# Patient Record
Sex: FEMALE | Race: WHITE | NOT HISPANIC OR LATINO | URBAN - METROPOLITAN AREA
[De-identification: names, ages, dates, MRNs, and addresses within clinical notes are randomized per-mention and may not be internally consistent; named-entity substitution may affect disease eponyms.]

---

## 2023-01-20 ENCOUNTER — INPATIENT (INPATIENT)
Facility: HOSPITAL | Age: 78
LOS: 3 days | Discharge: HOME | DRG: 557 | End: 2023-01-24
Attending: INTERNAL MEDICINE | Admitting: STUDENT IN AN ORGANIZED HEALTH CARE EDUCATION/TRAINING PROGRAM
Payer: COMMERCIAL

## 2023-01-20 VITALS
OXYGEN SATURATION: 95 % | RESPIRATION RATE: 16 BRPM | TEMPERATURE: 98 F | WEIGHT: 119.93 LBS | HEART RATE: 103 BPM | HEIGHT: 63 IN | SYSTOLIC BLOOD PRESSURE: 113 MMHG | DIASTOLIC BLOOD PRESSURE: 72 MMHG

## 2023-01-20 LAB
ALBUMIN SERPL ELPH-MCNC: 3.2 G/DL — LOW (ref 3.4–5)
ALBUMIN SERPL ELPH-MCNC: 3.9 G/DL — SIGNIFICANT CHANGE UP (ref 3.4–5)
ALP SERPL-CCNC: 101 U/L — SIGNIFICANT CHANGE UP (ref 40–120)
ALP SERPL-CCNC: 119 U/L — SIGNIFICANT CHANGE UP (ref 40–120)
ALT FLD-CCNC: 44 U/L — HIGH (ref 12–42)
ALT FLD-CCNC: 47 U/L — HIGH (ref 12–42)
ANION GAP SERPL CALC-SCNC: 5 MMOL/L — LOW (ref 9–16)
ANION GAP SERPL CALC-SCNC: 7 MMOL/L — LOW (ref 9–16)
APPEARANCE UR: CLEAR — SIGNIFICANT CHANGE UP
AST SERPL-CCNC: 113 U/L — HIGH (ref 15–37)
AST SERPL-CCNC: 89 U/L — HIGH (ref 15–37)
BACTERIA # UR AUTO: SIGNIFICANT CHANGE UP /HPF
BASOPHILS # BLD AUTO: 0.03 K/UL — SIGNIFICANT CHANGE UP (ref 0–0.2)
BASOPHILS NFR BLD AUTO: 0.4 % — SIGNIFICANT CHANGE UP (ref 0–2)
BILIRUB SERPL-MCNC: 0.6 MG/DL — SIGNIFICANT CHANGE UP (ref 0.2–1.2)
BILIRUB SERPL-MCNC: 0.7 MG/DL — SIGNIFICANT CHANGE UP (ref 0.2–1.2)
BILIRUB UR-MCNC: NEGATIVE — SIGNIFICANT CHANGE UP
BUN SERPL-MCNC: 11 MG/DL — SIGNIFICANT CHANGE UP (ref 7–23)
BUN SERPL-MCNC: 8 MG/DL — SIGNIFICANT CHANGE UP (ref 7–23)
CALCIUM SERPL-MCNC: 8.2 MG/DL — LOW (ref 8.5–10.5)
CALCIUM SERPL-MCNC: 9.6 MG/DL — SIGNIFICANT CHANGE UP (ref 8.5–10.5)
CHLORIDE SERPL-SCNC: 102 MMOL/L — SIGNIFICANT CHANGE UP (ref 96–108)
CHLORIDE SERPL-SCNC: 111 MMOL/L — HIGH (ref 96–108)
CK SERPL-CCNC: 4328 U/L — HIGH (ref 26–192)
CK SERPL-CCNC: 6316 U/L — HIGH (ref 26–192)
CO2 SERPL-SCNC: 24 MMOL/L — SIGNIFICANT CHANGE UP (ref 22–31)
CO2 SERPL-SCNC: 28 MMOL/L — SIGNIFICANT CHANGE UP (ref 22–31)
COLOR SPEC: YELLOW — SIGNIFICANT CHANGE UP
COMMENT - URINE: SIGNIFICANT CHANGE UP
CREAT SERPL-MCNC: 0.72 MG/DL — SIGNIFICANT CHANGE UP (ref 0.5–1.3)
CREAT SERPL-MCNC: 0.78 MG/DL — SIGNIFICANT CHANGE UP (ref 0.5–1.3)
DIFF PNL FLD: ABNORMAL
EGFR: 79 ML/MIN/1.73M2 — SIGNIFICANT CHANGE UP
EGFR: 87 ML/MIN/1.73M2 — SIGNIFICANT CHANGE UP
EOSINOPHIL # BLD AUTO: 0.01 K/UL — SIGNIFICANT CHANGE UP (ref 0–0.5)
EOSINOPHIL NFR BLD AUTO: 0.1 % — SIGNIFICANT CHANGE UP (ref 0–6)
EPI CELLS # UR: SIGNIFICANT CHANGE UP /HPF (ref 0–5)
GLUCOSE SERPL-MCNC: 130 MG/DL — HIGH (ref 70–99)
GLUCOSE SERPL-MCNC: 93 MG/DL — SIGNIFICANT CHANGE UP (ref 70–99)
GLUCOSE UR QL: NEGATIVE — SIGNIFICANT CHANGE UP
HCT VFR BLD CALC: 39 % — SIGNIFICANT CHANGE UP (ref 34.5–45)
HGB BLD-MCNC: 13.1 G/DL — SIGNIFICANT CHANGE UP (ref 11.5–15.5)
HYALINE CASTS # UR AUTO: ABNORMAL /LPF (ref 0–2)
IMM GRANULOCYTES NFR BLD AUTO: 0.1 % — SIGNIFICANT CHANGE UP (ref 0–0.9)
KETONES UR-MCNC: 15 MG/DL
LEUKOCYTE ESTERASE UR-ACNC: NEGATIVE — SIGNIFICANT CHANGE UP
LYMPHOCYTES # BLD AUTO: 0.5 K/UL — LOW (ref 1–3.3)
LYMPHOCYTES # BLD AUTO: 6.2 % — LOW (ref 13–44)
MCHC RBC-ENTMCNC: 30.7 PG — SIGNIFICANT CHANGE UP (ref 27–34)
MCHC RBC-ENTMCNC: 33.6 GM/DL — SIGNIFICANT CHANGE UP (ref 32–36)
MCV RBC AUTO: 91.3 FL — SIGNIFICANT CHANGE UP (ref 80–100)
MONOCYTES # BLD AUTO: 1.04 K/UL — HIGH (ref 0–0.9)
MONOCYTES NFR BLD AUTO: 12.9 % — SIGNIFICANT CHANGE UP (ref 2–14)
NEUTROPHILS # BLD AUTO: 6.48 K/UL — SIGNIFICANT CHANGE UP (ref 1.8–7.4)
NEUTROPHILS NFR BLD AUTO: 80.3 % — HIGH (ref 43–77)
NITRITE UR-MCNC: NEGATIVE — SIGNIFICANT CHANGE UP
NRBC # BLD: 0 /100 WBCS — SIGNIFICANT CHANGE UP (ref 0–0)
PH UR: 6.5 — SIGNIFICANT CHANGE UP (ref 5–8)
PLATELET # BLD AUTO: 129 K/UL — LOW (ref 150–400)
POTASSIUM SERPL-MCNC: 3.9 MMOL/L — SIGNIFICANT CHANGE UP (ref 3.5–5.3)
POTASSIUM SERPL-MCNC: 4 MMOL/L — SIGNIFICANT CHANGE UP (ref 3.5–5.3)
POTASSIUM SERPL-SCNC: 3.9 MMOL/L — SIGNIFICANT CHANGE UP (ref 3.5–5.3)
POTASSIUM SERPL-SCNC: 4 MMOL/L — SIGNIFICANT CHANGE UP (ref 3.5–5.3)
PROT SERPL-MCNC: 6.2 G/DL — LOW (ref 6.4–8.2)
PROT SERPL-MCNC: 7.3 G/DL — SIGNIFICANT CHANGE UP (ref 6.4–8.2)
PROT UR-MCNC: 30 MG/DL
RBC # BLD: 4.27 M/UL — SIGNIFICANT CHANGE UP (ref 3.8–5.2)
RBC # FLD: 12.1 % — SIGNIFICANT CHANGE UP (ref 10.3–14.5)
RBC CASTS # UR COMP ASSIST: < 5 /HPF — SIGNIFICANT CHANGE UP
SARS-COV-2 RNA SPEC QL NAA+PROBE: DETECTED
SODIUM SERPL-SCNC: 135 MMOL/L — SIGNIFICANT CHANGE UP (ref 132–145)
SODIUM SERPL-SCNC: 142 MMOL/L — SIGNIFICANT CHANGE UP (ref 132–145)
SP GR SPEC: 1.02 — SIGNIFICANT CHANGE UP (ref 1–1.03)
UROBILINOGEN FLD QL: 0.2 E.U./DL — SIGNIFICANT CHANGE UP
WBC # BLD: 8.07 K/UL — SIGNIFICANT CHANGE UP (ref 3.8–10.5)
WBC # FLD AUTO: 8.07 K/UL — SIGNIFICANT CHANGE UP (ref 3.8–10.5)
WBC UR QL: < 5 /HPF — SIGNIFICANT CHANGE UP

## 2023-01-20 PROCEDURE — 99223 1ST HOSP IP/OBS HIGH 75: CPT

## 2023-01-20 PROCEDURE — 73562 X-RAY EXAM OF KNEE 3: CPT | Mod: 26,LT

## 2023-01-20 PROCEDURE — 73070 X-RAY EXAM OF ELBOW: CPT | Mod: 26,LT,76

## 2023-01-20 PROCEDURE — 71045 X-RAY EXAM CHEST 1 VIEW: CPT | Mod: 26

## 2023-01-20 PROCEDURE — 72125 CT NECK SPINE W/O DYE: CPT | Mod: 26

## 2023-01-20 PROCEDURE — 72170 X-RAY EXAM OF PELVIS: CPT | Mod: 26

## 2023-01-20 PROCEDURE — 70450 CT HEAD/BRAIN W/O DYE: CPT | Mod: 26

## 2023-01-20 RX ORDER — SODIUM CHLORIDE 9 MG/ML
1000 INJECTION INTRAMUSCULAR; INTRAVENOUS; SUBCUTANEOUS
Refills: 0 | Status: DISCONTINUED | OUTPATIENT
Start: 2023-01-20 | End: 2023-01-21

## 2023-01-20 RX ORDER — TETANUS TOXOID, REDUCED DIPHTHERIA TOXOID AND ACELLULAR PERTUSSIS VACCINE, ADSORBED 5; 2.5; 8; 8; 2.5 [IU]/.5ML; [IU]/.5ML; UG/.5ML; UG/.5ML; UG/.5ML
0.5 SUSPENSION INTRAMUSCULAR ONCE
Refills: 0 | Status: COMPLETED | OUTPATIENT
Start: 2023-01-20 | End: 2023-01-20

## 2023-01-20 RX ORDER — SODIUM CHLORIDE 9 MG/ML
1000 INJECTION INTRAMUSCULAR; INTRAVENOUS; SUBCUTANEOUS ONCE
Refills: 0 | Status: COMPLETED | OUTPATIENT
Start: 2023-01-20 | End: 2023-01-20

## 2023-01-20 RX ADMIN — SODIUM CHLORIDE 250 MILLILITER(S): 9 INJECTION INTRAMUSCULAR; INTRAVENOUS; SUBCUTANEOUS at 18:58

## 2023-01-20 RX ADMIN — SODIUM CHLORIDE 1000 MILLILITER(S): 9 INJECTION INTRAMUSCULAR; INTRAVENOUS; SUBCUTANEOUS at 15:34

## 2023-01-20 RX ADMIN — SODIUM CHLORIDE 1000 MILLILITER(S): 9 INJECTION INTRAMUSCULAR; INTRAVENOUS; SUBCUTANEOUS at 14:16

## 2023-01-20 RX ADMIN — TETANUS TOXOID, REDUCED DIPHTHERIA TOXOID AND ACELLULAR PERTUSSIS VACCINE, ADSORBED 0.5 MILLILITER(S): 5; 2.5; 8; 8; 2.5 SUSPENSION INTRAMUSCULAR at 12:10

## 2023-01-20 RX ADMIN — SODIUM CHLORIDE 1000 MILLILITER(S): 9 INJECTION INTRAMUSCULAR; INTRAVENOUS; SUBCUTANEOUS at 23:21

## 2023-01-20 NOTE — ED ADULT TRIAGE NOTE - CHIEF COMPLAINT QUOTE
Pt BIBA from [facility] s/p fall at around 3 am today. No noted injury, no head strike. VSS. As per EMS pt sent for eval as this is her 3rd fall
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
## 2023-01-20 NOTE — ED PROVIDER NOTE - OBJECTIVE STATEMENT
76 y/o F with PMHx of psychiatric conditions (on trilafon, restoril, trazadone), HLD BIBEMS from [facility] s/p fall reportedly at 3 AM. Pt reports she rolled off her twin bed last night and landed on her back on the ground, denies LOC or AC use. Pt attempted to get up after but was unable to get up and states she was found int he morning by a worker at the NH. Currently pt is without any complaints. EMS reports this is pt 3rd fall and pt was sent for evaluation for recurrent falls but pt denies any other recent falls besides this an denies difficulty ambulating or associated lightheadedness, dizziness, cp, sob. Pt states she feels safe where she currently resides and would like to return after her workup here. 76 y/o F with PMHx of psychiatric conditions (on trilafon, restoril, trazadone), HLD BIBEMS from [facility] s/p fall reportedly at 3 AM. Pt reports she rolled off her twin bed last night and landed on her back on the ground, denies LOC or AC use. Pt attempted to get up after but was unable to get up and states she was found int he morning by a worker at the NH. Currently pt endorses fatigue and wants to sleep but without any complaints. EMS reports this is pt 3rd fall and pt was sent for evaluation for recurrent falls but pt denies any other recent falls besides this an denies difficulty ambulating or associated lightheadedness, dizziness, cp, sob. Pt states she feels safe where she currently resides and would like to return after her workup here. Denies fever/chills, HA, neck or back pain, dizziness, syncope, cough, cp, sob, abd pain, n/v/d/c, urinary ssx, leg swelling. Unknown if tdap up to date.

## 2023-01-20 NOTE — ED PROVIDER NOTE - CARE PLAN
1 Principal Discharge DX:	Fall  Assessment and plan of treatment:	Imaging for trauma unremarkable  Labs s/f cpk 4328, Cr 0.78, UA with small blood, covid+  Pt given 2 L NS, plan to admit under observation status, repeat CPK after continuous IVF, reassess gait and consider admission if pt is unable to ambulate  Secondary Diagnosis:	Rhabdomyolysis  Secondary Diagnosis:	2019 novel coronavirus disease (COVID-19)

## 2023-01-20 NOTE — ED PROVIDER NOTE - NS ED ATTENDING STATEMENT MOD
This was a shared visit with the NEAL. I reviewed and verified the documentation and independently performed the documented:

## 2023-01-20 NOTE — ED ADULT NURSE NOTE - OBJECTIVE STATEMENT
Pt came in c/o of fall out of bed today at [facility]. Pt denies pain, head strike, loc, ac use. Pt presents with redness and abrasion to the left knee and left hand. A&Ox3 speaking in full sentences. Bed alarm on

## 2023-01-20 NOTE — ED PROVIDER NOTE - NEUROLOGICAL, MLM
Alert and oriented, no focal deficits, no motor or sensory deficits, unable to ambulate as she states she is too fatigued

## 2023-01-20 NOTE — ED PROVIDER NOTE - WR ORDER NAME 4
Detail Level: Simple Additional Notes: Gave sample of DermaMend anti itch cream. Xray Elbow AP + Lateral, Right

## 2023-01-20 NOTE — ED ADULT NURSE NOTE - CHIEF COMPLAINT QUOTE
Pt BIBA from [facility] s/p fall at around 3 am today. No noted injury, no head strike. VSS. As per EMS pt sent for eval as this is her 3rd fall
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
## 2023-01-20 NOTE — ED PROVIDER NOTE - PHYSICAL EXAMINATION
Well-appearing in NAD, alert and oriented x 3, slow to answer questions  Head NCAT, No c/t/l midline or paraspinal TTP, no overlying erythema, swelling or edema, no stepoff or crepitus, full active ROM  +Left anterior shoulder abrasions, no chest wall TTP or crepitus  B/l posterior elbow abrasions, mild soft tissue TTP, no bony tenderness, no effusions  B/l UE with FROM at shoulder/elbow/wrist with strength 5/5,  strength 5/5, NVID  Pelvis stable  +Left anterior knee abrasion with mild soft tissue TTP, no bony tenderness, no effusion  B/l LE with FROM at hip/knee/ankles with strength 5/5, NVID  No facial droop, no pronator drift, no difficulty speaking or dysarthria, no hemineglect  B/l UE and LE with SILT  Refuses to ambulate 2/2 weakness

## 2023-01-20 NOTE — ED CDU PROVIDER INITIAL DAY NOTE - PROGRESS NOTE DETAILS
Pt received NS 2 L then continuous fluids  CPK uptrended from 4328 to 6316, stable cr 0.72  Will plan to admit

## 2023-01-20 NOTE — ED PROVIDER NOTE - PLAN OF CARE
Imaging for trauma unremarkable  Labs s/f cpk 4328, Cr 0.78, UA with small blood, covid+  Pt given 2 L NS, plan to admit under observation status, repeat CPK after continuous IVF, reassess gait and consider admission if pt is unable to ambulate
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
## 2023-01-21 DIAGNOSIS — U07.1 COVID-19: ICD-10-CM

## 2023-01-21 DIAGNOSIS — M62.82 RHABDOMYOLYSIS: ICD-10-CM

## 2023-01-21 DIAGNOSIS — F99 MENTAL DISORDER, NOT OTHERWISE SPECIFIED: ICD-10-CM
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
## 2023-01-21 DIAGNOSIS — E78.5 HYPERLIPIDEMIA, UNSPECIFIED: ICD-10-CM

## 2023-01-21 DIAGNOSIS — Z29.9 ENCOUNTER FOR PROPHYLACTIC MEASURES, UNSPECIFIED: ICD-10-CM

## 2023-01-21 LAB
ALBUMIN SERPL ELPH-MCNC: 4.2 G/DL — SIGNIFICANT CHANGE UP (ref 3.3–5)
ALP SERPL-CCNC: 117 U/L — SIGNIFICANT CHANGE UP (ref 40–120)
ALT FLD-CCNC: 61 U/L — HIGH (ref 10–45)
ANION GAP SERPL CALC-SCNC: 12 MMOL/L — SIGNIFICANT CHANGE UP (ref 5–17)
AST SERPL-CCNC: 197 U/L — HIGH (ref 10–40)
BILIRUB SERPL-MCNC: 0.4 MG/DL — SIGNIFICANT CHANGE UP (ref 0.2–1.2)
BUN SERPL-MCNC: 5 MG/DL — LOW (ref 7–23)
CALCIUM SERPL-MCNC: 9 MG/DL — SIGNIFICANT CHANGE UP (ref 8.4–10.5)
CHLORIDE SERPL-SCNC: 104 MMOL/L — SIGNIFICANT CHANGE UP (ref 96–108)
CK SERPL-CCNC: 8334 U/L — HIGH (ref 25–170)
CK SERPL-CCNC: 9855 U/L — HIGH (ref 25–170)
CO2 SERPL-SCNC: 21 MMOL/L — LOW (ref 22–31)
CREAT SERPL-MCNC: 0.55 MG/DL — SIGNIFICANT CHANGE UP (ref 0.5–1.3)
EGFR: 96 ML/MIN/1.73M2 — SIGNIFICANT CHANGE UP
GLUCOSE SERPL-MCNC: 134 MG/DL — HIGH (ref 70–99)
MAGNESIUM SERPL-MCNC: 2.1 MG/DL — SIGNIFICANT CHANGE UP (ref 1.6–2.6)
PHOSPHATE SERPL-MCNC: 1.7 MG/DL — LOW (ref 2.5–4.5)
POTASSIUM SERPL-MCNC: 4.1 MMOL/L — SIGNIFICANT CHANGE UP (ref 3.5–5.3)
POTASSIUM SERPL-SCNC: 4.1 MMOL/L — SIGNIFICANT CHANGE UP (ref 3.5–5.3)
PROT SERPL-MCNC: 7.1 G/DL — SIGNIFICANT CHANGE UP (ref 6–8.3)
SODIUM SERPL-SCNC: 137 MMOL/L — SIGNIFICANT CHANGE UP (ref 135–145)

## 2023-01-21 PROCEDURE — 99221 1ST HOSP IP/OBS SF/LOW 40: CPT

## 2023-01-21 RX ORDER — TEMAZEPAM 15 MG/1
30 CAPSULE ORAL AT BEDTIME
Refills: 0 | Status: DISCONTINUED | OUTPATIENT
Start: 2023-01-21 | End: 2023-01-24

## 2023-01-21 RX ORDER — PERPHENAZINE 8 MG/1
8 TABLET, FILM COATED ORAL EVERY 6 HOURS
Refills: 0 | Status: DISCONTINUED | OUTPATIENT
Start: 2023-01-21 | End: 2023-01-24

## 2023-01-21 RX ORDER — SODIUM,POTASSIUM PHOSPHATES 278-250MG
1 POWDER IN PACKET (EA) ORAL ONCE
Refills: 0 | Status: COMPLETED | OUTPATIENT
Start: 2023-01-21 | End: 2023-01-21

## 2023-01-21 RX ORDER — ENOXAPARIN SODIUM 100 MG/ML
40 INJECTION SUBCUTANEOUS EVERY 24 HOURS
Refills: 0 | Status: DISCONTINUED | OUTPATIENT
Start: 2023-01-21 | End: 2023-01-24

## 2023-01-21 RX ORDER — ACETAMINOPHEN 500 MG
650 TABLET ORAL ONCE
Refills: 0 | Status: COMPLETED | OUTPATIENT
Start: 2023-01-21 | End: 2023-01-21

## 2023-01-21 RX ORDER — ATORVASTATIN CALCIUM 80 MG/1
20 TABLET, FILM COATED ORAL AT BEDTIME
Refills: 0 | Status: DISCONTINUED | OUTPATIENT
Start: 2023-01-21 | End: 2023-01-23

## 2023-01-21 RX ORDER — TEMAZEPAM 15 MG/1
1 CAPSULE ORAL
Qty: 0 | Refills: 0 | DISCHARGE

## 2023-01-21 RX ORDER — TRAZODONE HCL 50 MG
100 TABLET ORAL AT BEDTIME
Refills: 0 | Status: DISCONTINUED | OUTPATIENT
Start: 2023-01-21 | End: 2023-01-24

## 2023-01-21 RX ORDER — PERPHENAZINE 8 MG/1
1 TABLET, FILM COATED ORAL
Qty: 0 | Refills: 0 | DISCHARGE

## 2023-01-21 RX ORDER — SODIUM CHLORIDE 9 MG/ML
1000 INJECTION INTRAMUSCULAR; INTRAVENOUS; SUBCUTANEOUS
Refills: 0 | Status: DISCONTINUED | OUTPATIENT
Start: 2023-01-21 | End: 2023-01-22

## 2023-01-21 RX ORDER — SODIUM CHLORIDE 9 MG/ML
1000 INJECTION INTRAMUSCULAR; INTRAVENOUS; SUBCUTANEOUS
Refills: 0 | Status: DISCONTINUED | OUTPATIENT
Start: 2023-01-21 | End: 2023-01-21

## 2023-01-21 RX ORDER — ACETAMINOPHEN 500 MG
650 TABLET ORAL EVERY 6 HOURS
Refills: 0 | Status: DISCONTINUED | OUTPATIENT
Start: 2023-01-21 | End: 2023-01-24

## 2023-01-21 RX ADMIN — Medication 650 MILLIGRAM(S): at 09:05

## 2023-01-21 RX ADMIN — ENOXAPARIN SODIUM 40 MILLIGRAM(S): 100 INJECTION SUBCUTANEOUS at 17:22

## 2023-01-21 RX ADMIN — PERPHENAZINE 8 MILLIGRAM(S): 8 TABLET, FILM COATED ORAL at 17:21

## 2023-01-21 RX ADMIN — Medication 100 MILLIGRAM(S): at 21:29

## 2023-01-21 RX ADMIN — Medication 1 PACKET(S): at 19:50

## 2023-01-21 RX ADMIN — ATORVASTATIN CALCIUM 20 MILLIGRAM(S): 80 TABLET, FILM COATED ORAL at 21:29

## 2023-01-21 NOTE — H&P ADULT - NSHPLABSRESULTS_GEN_ALL_CORE
.  LABS:                         13.1   8.07  )-----------( 129      ( 2023 10:41 )             39.0         142  |  111<H>  |  8   ----------------------------<  93  3.9   |  24  |  0.72    Ca    8.2<L>      2023 20:10    TPro  6.2<L>  /  Alb  3.2<L>  /  TBili  0.6  /  DBili  x   /  AST  113<H>  /  ALT  44<H>  /  AlkPhos  101        Urinalysis Basic - ( 2023 13:43 )    Color: Yellow / Appearance: Clear / S.020 / pH: x  Gluc: x / Ketone: 15 mg/dL  / Bili: NEGATIVE / Urobili: 0.2 E.U./dL   Blood: x / Protein: 30 mg/dL / Nitrite: NEGATIVE   Leuk Esterase: NEGATIVE / RBC: < 5 /HPF / WBC < 5 /HPF   Sq Epi: x / Non Sq Epi: 0-5 /HPF / Bacteria: None /HPF      CARDIAC MARKERS ( 2023 20:10 )  x     / x     / 6316 U/L / x     / x      CARDIAC MARKERS ( 2023 10:41 )  x     / x     / 4328 U/L / x     / x                RADIOLOGY, EKG & ADDITIONAL TESTS: Reviewed.

## 2023-01-21 NOTE — PATIENT PROFILE ADULT - FALL HARM RISK - HARM RISK INTERVENTIONS
Assistance with ambulation/Assistance OOB with selected safe patient handling equipment/Communicate Risk of Fall with Harm to all staff/Discuss with provider need for PT consult/Monitor gait and stability/Provide patient with walking aids - walker, cane, crutches/Reinforce activity limits and safety measures with patient and family/Tailored Fall Risk Interventions/Use of alarms - bed, chair and/or voice tab/Visual Cue: Yellow wristband and red socks/Bed in lowest position, wheels locked, appropriate side rails in place/Call bell, personal items and telephone in reach/Instruct patient to call for assistance before getting out of bed or chair/Non-slip footwear when patient is out of bed/[place] to call system/Physically safe environment - no spills, clutter or unnecessary equipment/Purposeful Proactive Rounding/Room/bathroom lighting operational, light cord in reach

## 2023-01-21 NOTE — H&P ADULT - PROBLEM SELECTOR PLAN 3
- continue with lipitor qd found to be COVID +, asymptomatic  - saturating well on RA,   - no need for decadron or remdesivir  - vaccinatedx2 with booster

## 2023-01-21 NOTE — H&P ADULT - HISTORY OF PRESENT ILLNESS
78 y/o F with PMHx of psychiatric conditions (on trilafon, restoril, trazadone), HLD BIBEMS from [facility] s/p fall   78 y/o F with PMHx of psychiatric conditions (on trilafon, restoril, trazadone) and HLD BIBEMS from [facility] s/p fall. Patient states she rolled out of bed in the middle of the night. She denies any LOC or head trauma, states she fell on her back. She denies any pain currently. She has hx of falls, has fallen three times in past- but none recently. She denies any dizziness, chest pain, SOB, or HA. Denies palpitations. She spent the night on the floor and did not get up until this morning.,     In the ED, patient afebrile with VSS. She has CK elevation ~6000 and was given IVF. Patient admitted for PT and IVF in setting of mild rhabdomyolysis   CT head without evidence of acute stroke, fracture, or hemorrhage.  CT cervical spine, Xrays of elbows and knee all without evidence of acute fracture.

## 2023-01-21 NOTE — ED ADULT NURSE REASSESSMENT NOTE - NS ED NURSE REASSESS COMMENT FT1
PT. received AAOx4 semi fowlers in stretcher breathing at ease on room air in NAD. 20g to RAC no redness, swelling, or tenderness noted. Pt. is admitted pending transport to [facility]. Pt. repositioned in bed and changed into gown,.
Pt received from [facility]. Pt resting comfortably in bed. No s/s of acute distress. Will continue to monitor
Patient made aware that she is admitted and is awaiting transfer to [facility], resting comfortably on the stretcher, no acute distress noted.
Patient received resting on the stretcher, awake and alert, axox3. Patient denies acute pain at this time. Awaiting repeat lab work, has fluids infusing at this time.

## 2023-01-21 NOTE — PATIENT PROFILE ADULT - FALL HARM RISK - PATIENT NEEDS ASSISTANCE
What Is The Condition That You Are Returning For Follow-Up?: other
Additional History: Pt is here for 6 mo f/u for psoriasis. She is doing a little better know but all summer she had flare ups   Pt is taking Otezla and the clobetasol
Standing/Walking

## 2023-01-21 NOTE — H&P ADULT - NSHPPHYSICALEXAM_GEN_ALL_CORE
.  VITAL SIGNS:  T(C): 37.1 (01-21-23 @ 09:54), Max: 38 (01-21-23 @ 08:57)  T(F): 98.8 (01-21-23 @ 09:54), Max: 100.4 (01-21-23 @ 08:57)  HR: 79 (01-21-23 @ 09:54) (66 - 89)  BP: 134/67 (01-21-23 @ 09:54) (117/72 - 141/80)  BP(mean): 84 (01-21-23 @ 03:49) (84 - 85)  RR: 17 (01-21-23 @ 09:54) (16 - 18)  SpO2: 96% (01-21-23 @ 09:54) (93% - 97%)  Wt(kg): --    PHYSICAL EXAM:    Constitutional: NAD   HEENT: NC/AT, PERRL, EOMI, clear conjunctiva, uvula midline, no oropharyngeal erythema or exudates; MMM  Neck: supple; no JVD   Respiratory: CTA B/L; no W/R/R, no retractions  Cardiac: +S1/S2; RRR; no M/R/G  Gastrointestinal: soft, NT/ND; no rebound or guarding; +BSx4  Back: no vertebral point tenderness, no CVAT B/L  Extremities: WWP, no clubbing or cyanosis; no peripheral edema  Musculoskeletal: NROM x4; no joint swelling, tenderness or erythema  Vascular: peripheral pulses present   Dermatologic: skin warm, dry and intact; no rashes, wounds, or scars  Neurologic: AAOx3; CNII-XII grossly intact; no focal deficits .  VITAL SIGNS:  T(C): 37.1 (01-21-23 @ 09:54), Max: 38 (01-21-23 @ 08:57)  T(F): 98.8 (01-21-23 @ 09:54), Max: 100.4 (01-21-23 @ 08:57)  HR: 79 (01-21-23 @ 09:54) (66 - 89)  BP: 134/67 (01-21-23 @ 09:54) (117/72 - 141/80)  BP(mean): 84 (01-21-23 @ 03:49) (84 - 85)  RR: 17 (01-21-23 @ 09:54) (16 - 18)  SpO2: 96% (01-21-23 @ 09:54) (93% - 97%)  Wt(kg): --    PHYSICAL EXAM:    Constitutional: NAD   HEENT: NC/AT, PERRL, EOMI, clear conjunctiva, uvula midline, no oropharyngeal erythema or exudates; MMM  Neck: supple; no JVD   Respiratory: CTA B/L; no W/R/R, no retractions  Cardiac: +S1/S2; RRR; no M/R/G  Gastrointestinal: soft, NT/ND; no rebound or guarding; +BSx4  Back: no vertebral point tenderness, no CVAT B/L  Extremities: WWP, no clubbing or cyanosis; no peripheral edema  Musculoskeletal: NROM x4; no joint swelling, tenderness or erythema  Vascular: peripheral pulses present   Dermatologic: skin warm, dry and intact; no rashes, wounds, or scars, ecchymosis of elbows and knees  Neurologic: AAOx3; CNII-XII grossly intact; no focal deficits

## 2023-01-21 NOTE — H&P ADULT - PROBLEM SELECTOR PLAN 6
F: NS @100  E; replete as needed, close monitoring in setting of rhabdo  N: regular   DVT: lovenox     Dispo: RMF

## 2023-01-21 NOTE — H&P ADULT - PROBLEM SELECTOR PLAN 1
s/p mechanical fall (rolled out of bed), no LOC or head trauma  - CT head without evidence of stroke, hemorrhage, or fracture  - CT cervical spine without aciute fracture   - Xray pelvis, knee and elbows without fractures     Plan   - PT/OT  - tylenol prn for pain

## 2023-01-21 NOTE — H&P ADULT - PROBLEM SELECTOR PLAN 4
- unknown psychiatric illness  - home medications include: trazodone 100mg, trilafon 8mg qid, restoril 30mg qhs   - continue home medications   - medications confirmed with [facility] - continue with lipitor qd

## 2023-01-21 NOTE — H&P ADULT - PROBLEM SELECTOR PLAN 2
- CK elevated to ~6000, UA with blood and no RBCs  - 2/2 fall   - potassium wnl, Cr stable, exam benign       Plan   - continue with IVF, NS @100/hr   - monitor UOP, strict I/Os  - monitor CMP qd, watch for signs of renal injury or hyperkalemia   - encourage PO intake       #Transaminitis   - possibly 2/2 rhabdo   - other etiology hepatic steatosis, no alcohol use  - trend CMP 06-Jul-2017

## 2023-01-21 NOTE — H&P ADULT - ASSESSMENT
78 y/o F with PMHx of psychiatric conditions (on trilafon, restoril, trazadone) and HLD BIBEMS from [facility] s/p mechanical fall, found to have acute rhabdomyolysis.

## 2023-01-22 LAB
ALBUMIN SERPL ELPH-MCNC: 3.2 G/DL — LOW (ref 3.3–5)
ALP SERPL-CCNC: 88 U/L — SIGNIFICANT CHANGE UP (ref 40–120)
ALT FLD-CCNC: 47 U/L — HIGH (ref 10–45)
ANION GAP SERPL CALC-SCNC: 10 MMOL/L — SIGNIFICANT CHANGE UP (ref 5–17)
AST SERPL-CCNC: 138 U/L — HIGH (ref 10–40)
BASOPHILS # BLD AUTO: 0.04 K/UL — SIGNIFICANT CHANGE UP (ref 0–0.2)
BASOPHILS NFR BLD AUTO: 0.8 % — SIGNIFICANT CHANGE UP (ref 0–2)
BILIRUB SERPL-MCNC: 0.4 MG/DL — SIGNIFICANT CHANGE UP (ref 0.2–1.2)
BUN SERPL-MCNC: 6 MG/DL — LOW (ref 7–23)
CALCIUM SERPL-MCNC: 8.2 MG/DL — LOW (ref 8.4–10.5)
CHLORIDE SERPL-SCNC: 106 MMOL/L — SIGNIFICANT CHANGE UP (ref 96–108)
CK SERPL-CCNC: 5029 U/L — HIGH (ref 25–170)
CO2 SERPL-SCNC: 22 MMOL/L — SIGNIFICANT CHANGE UP (ref 22–31)
CREAT SERPL-MCNC: 0.62 MG/DL — SIGNIFICANT CHANGE UP (ref 0.5–1.3)
EGFR: 93 ML/MIN/1.73M2 — SIGNIFICANT CHANGE UP
EOSINOPHIL # BLD AUTO: 0.06 K/UL — SIGNIFICANT CHANGE UP (ref 0–0.5)
EOSINOPHIL NFR BLD AUTO: 1.2 % — SIGNIFICANT CHANGE UP (ref 0–6)
GLUCOSE SERPL-MCNC: 107 MG/DL — HIGH (ref 70–99)
HCT VFR BLD CALC: 36.2 % — SIGNIFICANT CHANGE UP (ref 34.5–45)
HCV AB S/CO SERPL IA: 0.05 S/CO — SIGNIFICANT CHANGE UP
HCV AB SERPL-IMP: SIGNIFICANT CHANGE UP
HGB BLD-MCNC: 12 G/DL — SIGNIFICANT CHANGE UP (ref 11.5–15.5)
IMM GRANULOCYTES NFR BLD AUTO: 0.2 % — SIGNIFICANT CHANGE UP (ref 0–0.9)
LYMPHOCYTES # BLD AUTO: 0.97 K/UL — LOW (ref 1–3.3)
LYMPHOCYTES # BLD AUTO: 20.1 % — SIGNIFICANT CHANGE UP (ref 13–44)
MAGNESIUM SERPL-MCNC: 1.9 MG/DL — SIGNIFICANT CHANGE UP (ref 1.6–2.6)
MCHC RBC-ENTMCNC: 30.5 PG — SIGNIFICANT CHANGE UP (ref 27–34)
MCHC RBC-ENTMCNC: 33.1 GM/DL — SIGNIFICANT CHANGE UP (ref 32–36)
MCV RBC AUTO: 92.1 FL — SIGNIFICANT CHANGE UP (ref 80–100)
MONOCYTES # BLD AUTO: 0.8 K/UL — SIGNIFICANT CHANGE UP (ref 0–0.9)
MONOCYTES NFR BLD AUTO: 16.6 % — HIGH (ref 2–14)
NEUTROPHILS # BLD AUTO: 2.94 K/UL — SIGNIFICANT CHANGE UP (ref 1.8–7.4)
NEUTROPHILS NFR BLD AUTO: 61.1 % — SIGNIFICANT CHANGE UP (ref 43–77)
NRBC # BLD: 0 /100 WBCS — SIGNIFICANT CHANGE UP (ref 0–0)
PHOSPHATE SERPL-MCNC: 2.2 MG/DL — LOW (ref 2.5–4.5)
PLATELET # BLD AUTO: 107 K/UL — LOW (ref 150–400)
POTASSIUM SERPL-MCNC: 3.3 MMOL/L — LOW (ref 3.5–5.3)
POTASSIUM SERPL-SCNC: 3.3 MMOL/L — LOW (ref 3.5–5.3)
PROT SERPL-MCNC: 5.6 G/DL — LOW (ref 6–8.3)
RBC # BLD: 3.93 M/UL — SIGNIFICANT CHANGE UP (ref 3.8–5.2)
RBC # FLD: 12.5 % — SIGNIFICANT CHANGE UP (ref 10.3–14.5)
SODIUM SERPL-SCNC: 138 MMOL/L — SIGNIFICANT CHANGE UP (ref 135–145)
WBC # BLD: 4.82 K/UL — SIGNIFICANT CHANGE UP (ref 3.8–10.5)
WBC # FLD AUTO: 4.82 K/UL — SIGNIFICANT CHANGE UP (ref 3.8–10.5)

## 2023-01-22 PROCEDURE — 99232 SBSQ HOSP IP/OBS MODERATE 35: CPT | Mod: GC

## 2023-01-22 RX ORDER — POTASSIUM PHOSPHATE, MONOBASIC POTASSIUM PHOSPHATE, DIBASIC 236; 224 MG/ML; MG/ML
30 INJECTION, SOLUTION INTRAVENOUS ONCE
Refills: 0 | Status: COMPLETED | OUTPATIENT
Start: 2023-01-22 | End: 2023-01-22

## 2023-01-22 RX ORDER — POTASSIUM CHLORIDE 20 MEQ
20 PACKET (EA) ORAL
Refills: 0 | Status: COMPLETED | OUTPATIENT
Start: 2023-01-22 | End: 2023-01-22

## 2023-01-22 RX ORDER — SODIUM CHLORIDE 9 MG/ML
1000 INJECTION, SOLUTION INTRAVENOUS
Refills: 0 | Status: COMPLETED | OUTPATIENT
Start: 2023-01-22 | End: 2023-01-22

## 2023-01-22 RX ADMIN — Medication 20 MILLIEQUIVALENT(S): at 13:20

## 2023-01-22 RX ADMIN — ENOXAPARIN SODIUM 40 MILLIGRAM(S): 100 INJECTION SUBCUTANEOUS at 18:26

## 2023-01-22 RX ADMIN — PERPHENAZINE 8 MILLIGRAM(S): 8 TABLET, FILM COATED ORAL at 03:33

## 2023-01-22 RX ADMIN — Medication 20 MILLIEQUIVALENT(S): at 11:07

## 2023-01-22 RX ADMIN — POTASSIUM PHOSPHATE, MONOBASIC POTASSIUM PHOSPHATE, DIBASIC 83.33 MILLIMOLE(S): 236; 224 INJECTION, SOLUTION INTRAVENOUS at 11:55

## 2023-01-22 RX ADMIN — ATORVASTATIN CALCIUM 20 MILLIGRAM(S): 80 TABLET, FILM COATED ORAL at 21:40

## 2023-01-22 RX ADMIN — SODIUM CHLORIDE 100 MILLILITER(S): 9 INJECTION, SOLUTION INTRAVENOUS at 11:07

## 2023-01-22 RX ADMIN — SODIUM CHLORIDE 100 MILLILITER(S): 9 INJECTION INTRAMUSCULAR; INTRAVENOUS; SUBCUTANEOUS at 02:47

## 2023-01-22 RX ADMIN — Medication 100 MILLIGRAM(S): at 21:40

## 2023-01-22 RX ADMIN — PERPHENAZINE 8 MILLIGRAM(S): 8 TABLET, FILM COATED ORAL at 18:26

## 2023-01-22 RX ADMIN — PERPHENAZINE 8 MILLIGRAM(S): 8 TABLET, FILM COATED ORAL at 11:07

## 2023-01-22 NOTE — PHYSICAL THERAPY INITIAL EVALUATION ADULT - ADDITIONAL COMMENTS
no steps, 1 fall out of bed that resulted in current hospital admission, no use of AD, lived in [facility] x 19 yrs

## 2023-01-22 NOTE — PHYSICAL THERAPY INITIAL EVALUATION ADULT - PERTINENT HX OF CURRENT PROBLEM, REHAB EVAL
77F from [facility] s/p fall. Patient states she rolled out of bed in the middle of the night. She denies any LOC or head trauma, states she fell on her back. She denies any pain currently. She has hx of falls, has fallen three times in past- but none recently

## 2023-01-22 NOTE — PROGRESS NOTE ADULT - PROBLEM SELECTOR PLAN 5
- unknown psychiatric illness  - home medications include: trazodone 100mg, trilafon 8mg qid, restoril 30mg qhs   - continue home medications   - medications confirmed with [facility]

## 2023-01-22 NOTE — PROGRESS NOTE ADULT - PROBLEM SELECTOR PLAN 2
- CK elevated to ~6000, UA with blood and no RBCs  - 2/2 fall   - potassium wnl, Cr stable, exam benign       Plan   - continue with IVF, NS @100/hr   - monitor UOP, strict I/Os  - monitor CMP qd, watch for signs of renal injury or hyperkalemia   - encourage PO intake       #Transaminitis   - possibly 2/2 rhabdo   - other etiology hepatic steatosis, no alcohol use  - trend CMP - CK elevated to ~6000, UA with blood and no RBCs  - 2/2 fall   - potassium wnl, Cr stable, exam benign       Plan   - continue with IVF, LR @100/hr   - monitor UOP, strict I/Os  - monitor CMP qd, watch for signs of renal injury or hyperkalemia   - encourage PO intake       #Transaminitis   - possibly 2/2 rhabdo   - other etiology hepatic steatosis, no alcohol use  - trend CMP

## 2023-01-22 NOTE — PROGRESS NOTE ADULT - SUBJECTIVE AND OBJECTIVE BOX
**Incomplete Note**   some light-headedness which she reports has been present even before covid19  otherwise no feves/chills/night sweats    Physical Exam:  General: NAD  Resp: no increased WOB, CTAB  CVS: RRR, no m/r/g, no pitting edema  GI: +BS, nt/nd  Neuro: alert and oriented to person place time and situation

## 2023-01-23 LAB
ALBUMIN SERPL ELPH-MCNC: 3.4 G/DL — SIGNIFICANT CHANGE UP (ref 3.3–5)
ALP SERPL-CCNC: 90 U/L — SIGNIFICANT CHANGE UP (ref 40–120)
ALT FLD-CCNC: 55 U/L — HIGH (ref 10–45)
ANION GAP SERPL CALC-SCNC: 9 MMOL/L — SIGNIFICANT CHANGE UP (ref 5–17)
AST SERPL-CCNC: 145 U/L — HIGH (ref 10–40)
BASOPHILS # BLD AUTO: 0.02 K/UL — SIGNIFICANT CHANGE UP (ref 0–0.2)
BASOPHILS NFR BLD AUTO: 0.4 % — SIGNIFICANT CHANGE UP (ref 0–2)
BILIRUB SERPL-MCNC: 0.5 MG/DL — SIGNIFICANT CHANGE UP (ref 0.2–1.2)
BUN SERPL-MCNC: 8 MG/DL — SIGNIFICANT CHANGE UP (ref 7–23)
CALCIUM SERPL-MCNC: 8.8 MG/DL — SIGNIFICANT CHANGE UP (ref 8.4–10.5)
CHLORIDE SERPL-SCNC: 106 MMOL/L — SIGNIFICANT CHANGE UP (ref 96–108)
CK SERPL-CCNC: 3785 U/L — HIGH (ref 25–170)
CO2 SERPL-SCNC: 24 MMOL/L — SIGNIFICANT CHANGE UP (ref 22–31)
CREAT SERPL-MCNC: 0.7 MG/DL — SIGNIFICANT CHANGE UP (ref 0.5–1.3)
EGFR: 90 ML/MIN/1.73M2 — SIGNIFICANT CHANGE UP
EOSINOPHIL # BLD AUTO: 0.07 K/UL — SIGNIFICANT CHANGE UP (ref 0–0.5)
EOSINOPHIL NFR BLD AUTO: 1.5 % — SIGNIFICANT CHANGE UP (ref 0–6)
GLUCOSE SERPL-MCNC: 117 MG/DL — HIGH (ref 70–99)
HCT VFR BLD CALC: 38.3 % — SIGNIFICANT CHANGE UP (ref 34.5–45)
HGB BLD-MCNC: 12.6 G/DL — SIGNIFICANT CHANGE UP (ref 11.5–15.5)
IMM GRANULOCYTES NFR BLD AUTO: 0.2 % — SIGNIFICANT CHANGE UP (ref 0–0.9)
LYMPHOCYTES # BLD AUTO: 1.08 K/UL — SIGNIFICANT CHANGE UP (ref 1–3.3)
LYMPHOCYTES # BLD AUTO: 22.4 % — SIGNIFICANT CHANGE UP (ref 13–44)
MAGNESIUM SERPL-MCNC: 2 MG/DL — SIGNIFICANT CHANGE UP (ref 1.6–2.6)
MCHC RBC-ENTMCNC: 30.6 PG — SIGNIFICANT CHANGE UP (ref 27–34)
MCHC RBC-ENTMCNC: 32.9 GM/DL — SIGNIFICANT CHANGE UP (ref 32–36)
MCV RBC AUTO: 93 FL — SIGNIFICANT CHANGE UP (ref 80–100)
MONOCYTES # BLD AUTO: 0.76 K/UL — SIGNIFICANT CHANGE UP (ref 0–0.9)
MONOCYTES NFR BLD AUTO: 15.8 % — HIGH (ref 2–14)
NEUTROPHILS # BLD AUTO: 2.88 K/UL — SIGNIFICANT CHANGE UP (ref 1.8–7.4)
NEUTROPHILS NFR BLD AUTO: 59.7 % — SIGNIFICANT CHANGE UP (ref 43–77)
NRBC # BLD: 0 /100 WBCS — SIGNIFICANT CHANGE UP (ref 0–0)
PHOSPHATE SERPL-MCNC: 3 MG/DL — SIGNIFICANT CHANGE UP (ref 2.5–4.5)
PLATELET # BLD AUTO: 140 K/UL — LOW (ref 150–400)
POTASSIUM SERPL-MCNC: 4.5 MMOL/L — SIGNIFICANT CHANGE UP (ref 3.5–5.3)
POTASSIUM SERPL-SCNC: 4.5 MMOL/L — SIGNIFICANT CHANGE UP (ref 3.5–5.3)
PROT SERPL-MCNC: 6.1 G/DL — SIGNIFICANT CHANGE UP (ref 6–8.3)
RBC # BLD: 4.12 M/UL — SIGNIFICANT CHANGE UP (ref 3.8–5.2)
RBC # FLD: 12.4 % — SIGNIFICANT CHANGE UP (ref 10.3–14.5)
SODIUM SERPL-SCNC: 139 MMOL/L — SIGNIFICANT CHANGE UP (ref 135–145)
WBC # BLD: 4.82 K/UL — SIGNIFICANT CHANGE UP (ref 3.8–10.5)
WBC # FLD AUTO: 4.82 K/UL — SIGNIFICANT CHANGE UP (ref 3.8–10.5)

## 2023-01-23 PROCEDURE — 99239 HOSP IP/OBS DSCHRG MGMT >30: CPT | Mod: GC

## 2023-01-23 RX ORDER — SODIUM CHLORIDE 9 MG/ML
1000 INJECTION, SOLUTION INTRAVENOUS
Refills: 0 | Status: DISCONTINUED | OUTPATIENT
Start: 2023-01-23 | End: 2023-01-24

## 2023-01-23 RX ORDER — TRAZODONE HCL 50 MG
0 TABLET ORAL
Qty: 0 | Refills: 0 | DISCHARGE

## 2023-01-23 RX ORDER — ATORVASTATIN CALCIUM 80 MG/1
20 TABLET, FILM COATED ORAL AT BEDTIME
Refills: 0 | Status: DISCONTINUED | OUTPATIENT
Start: 2023-01-23 | End: 2023-01-24

## 2023-01-23 RX ADMIN — PERPHENAZINE 8 MILLIGRAM(S): 8 TABLET, FILM COATED ORAL at 12:49

## 2023-01-23 RX ADMIN — SODIUM CHLORIDE 100 MILLILITER(S): 9 INJECTION, SOLUTION INTRAVENOUS at 08:33

## 2023-01-23 RX ADMIN — PERPHENAZINE 8 MILLIGRAM(S): 8 TABLET, FILM COATED ORAL at 17:58

## 2023-01-23 RX ADMIN — PERPHENAZINE 8 MILLIGRAM(S): 8 TABLET, FILM COATED ORAL at 05:49

## 2023-01-23 RX ADMIN — ATORVASTATIN CALCIUM 20 MILLIGRAM(S): 80 TABLET, FILM COATED ORAL at 23:29

## 2023-01-23 RX ADMIN — ENOXAPARIN SODIUM 40 MILLIGRAM(S): 100 INJECTION SUBCUTANEOUS at 17:57

## 2023-01-23 RX ADMIN — PERPHENAZINE 8 MILLIGRAM(S): 8 TABLET, FILM COATED ORAL at 00:08

## 2023-01-23 RX ADMIN — Medication 100 MILLIGRAM(S): at 23:29

## 2023-01-23 NOTE — DISCHARGE NOTE PROVIDER - NSDCCPCAREPLAN_GEN_ALL_CORE_FT
PRINCIPAL DISCHARGE DIAGNOSIS  Diagnosis: Fall  Assessment and Plan of Treatment: You have had a fall today. It appears that the cause is “mechanical”. That means that you slipped, tripped or lost your balance. If your fall had been due to fainting or a seizure, further tests would be required. Health conditions which change your blood pressure, vision, muscle strength and coordination may increase your risk for falls. Medication can also increase your risk if they make you dizzy, weak, or sleepy. To prevent falls, you can stand or sit up slowly, use assistive devices as directed, wear shoes that fit well and have soles that , wear a personal alarm, stay active, and manage your medical conditions. Home safety tips include keeping your path clear, removing small rugs, not walking on wet surfaces, installing bright lights at home, and keeping items you use often on shelves within reach.        SECONDARY DISCHARGE DIAGNOSES  Diagnosis: Rhabdomyolysis  Assessment and Plan of Treatment: You were found to have rhabdomyolysis after a fall. You were given IV fluids and your labs improved.    Diagnosis: 2019 novel coronavirus disease (COVID-19)  Assessment and Plan of Treatment:      PRINCIPAL DISCHARGE DIAGNOSIS  Diagnosis: Fall  Assessment and Plan of Treatment: You have had a fall today. It appears that the cause is “mechanical”. That means that you slipped, tripped or lost your balance. If your fall had been due to fainting or a seizure, further tests would be required. Health conditions which change your blood pressure, vision, muscle strength and coordination may increase your risk for falls. Medication can also increase your risk if they make you dizzy, weak, or sleepy. To prevent falls, you can stand or sit up slowly, use assistive devices as directed, wear shoes that fit well and have soles that , wear a personal alarm, stay active, and manage your medical conditions. Home safety tips include keeping your path clear, removing small rugs, not walking on wet surfaces, installing bright lights at home, and keeping items you use often on shelves within reach.        SECONDARY DISCHARGE DIAGNOSES  Diagnosis: Rhabdomyolysis  Assessment and Plan of Treatment: You were found to have rhabdomyolysis after a fall. You were given IV fluids and your labs improved. Your atorvastatin with changed to crestor which is safer for patients who have had rhabdomyolysis.    Diagnosis: 2019 novel coronavirus disease (COVID-19)  Assessment and Plan of Treatment:

## 2023-01-23 NOTE — DISCHARGE NOTE PROVIDER - CARE PROVIDER_API CALL
[facility],   Phone: (   )    -  Fax: (   )    -  Follow Up Time:

## 2023-01-23 NOTE — PROGRESS NOTE ADULT - PROBLEM SELECTOR PLAN 2
- CK elevated to ~6000, UA with blood and no RBCs  - 2/2 fall   - potassium wnl, Cr stable, exam benign       Plan   - continue with IVF, LR @100/hr   - monitor UOP, strict I/Os  - monitor CMP qd, watch for signs of renal injury or hyperkalemia   - encourage PO intake       #Transaminitis   - possibly 2/2 rhabdo   - other etiology hepatic steatosis, no alcohol use  - trend CMP

## 2023-01-23 NOTE — PROGRESS NOTE ADULT - SUBJECTIVE AND OBJECTIVE BOX
**Incomplete Note**   CC: Patient is a 77y old  Female who presents with a chief complaint of Fall (24 Jan 2023 07:30)      INTERVAL EVENTS: ANIKA    SUBJECTIVE / INTERVAL HPI: Patient seen and examined at bedside. Endorses muscle and joint stiffness in the legs, bilaterally. Denies fever, chills, cough, SOB, nausea, abdominal pain, chest pain.    ROS: negative unless otherwise stated above.    VITAL SIGNS:  Vital Signs Last 24 Hrs  T(C): 36.4 (24 Jan 2023 08:00), Max: 37.3 (23 Jan 2023 12:49)  T(F): 97.5 (24 Jan 2023 08:00), Max: 99.1 (23 Jan 2023 12:49)  HR: 79 (24 Jan 2023 08:00) (62 - 79)  BP: 150/87 (24 Jan 2023 08:00) (123/69 - 150/87)  BP(mean): 87 (24 Jan 2023 05:00) (87 - 87)  RR: 17 (24 Jan 2023 08:00) (17 - 18)  SpO2: 98% (24 Jan 2023 08:00) (95% - 98%)    Parameters below as of 24 Jan 2023 08:00  Patient On (Oxygen Delivery Method): room air          01-23-23 @ 07:01  -  01-24-23 @ 07:00  --------------------------------------------------------  IN: 1640 mL / OUT: 550 mL / NET: 1090 mL        PHYSICAL EXAM:  General: NAD, elderly female  HEENT: MMM  Neck: supple  Cardiovascular: +S1/S2; RRR  Respiratory: CTA B/L; no W/R/R  Gastrointestinal: soft, NT/ND  Extremities: WWP; no edema, clubbing or cyanosis  Vascular: 2+ radial, DP/PT pulses B/L  MSK: full ROM of the arms and legs  Neurological: AAOx3; no focal deficits  Psych: flat affect    MEDICATIONS:  MEDICATIONS  (STANDING):  atorvastatin 20 milliGRAM(s) Oral at bedtime  enoxaparin Injectable 40 milliGRAM(s) SubCutaneous every 24 hours  lactated ringers. 1000 milliLiter(s) (100 mL/Hr) IV Continuous <Continuous>  perphenazine 8 milliGRAM(s) Oral every 6 hours  traZODone 100 milliGRAM(s) Oral at bedtime    MEDICATIONS  (PRN):  acetaminophen     Tablet .. 650 milliGRAM(s) Oral every 6 hours PRN Temp greater or equal to 38C (100.4F), Mild Pain (1 - 3)  temazepam 30 milliGRAM(s) Oral at bedtime PRN Insomnia      ALLERGIES:  Allergies    No Known Allergies    Intolerances        LABS:                        12.6   4.82  )-----------( 140      ( 23 Jan 2023 05:30 )             38.3     01-23    139  |  106  |  8   ----------------------------<  117<H>  4.5   |  24  |  0.70    Ca    8.8      23 Jan 2023 05:30  Phos  3.0     01-23  Mg     2.0     01-23    TPro  6.1  /  Alb  3.4  /  TBili  0.5  /  DBili  x   /  AST  145<H>  /  ALT  55<H>  /  AlkPhos  90  01-23        CAPILLARY BLOOD GLUCOSE          RADIOLOGY & ADDITIONAL TESTS: Reviewed.

## 2023-01-23 NOTE — DISCHARGE NOTE NURSING/CASE MANAGEMENT/SOCIAL WORK - PATIENT PORTAL LINK FT
You can access the FollowMyHealth Patient Portal offered by [facility] by registering at the following website: [email or web address]. By joining [facility]’s FollowMyHealth portal, you will also be able to view your health information using other applications (apps) compatible with our system.

## 2023-01-23 NOTE — DISCHARGE NOTE PROVIDER - HOSPITAL COURSE
#Discharge: do not delete     76 y/o F with PMHx of psychiatric conditions (on trilafon, restoril, trazadone) and HLD BIBEMS from [facility] s/p mechanical fall, found to have acute rhabdomyolysis.     Problem List/Main Diagnoses (system-based):     #Fall.   s/p mechanical fall (rolled out of bed), no LOC or head trauma  - CT head without evidence of stroke, hemorrhage, or fracture  - CT cervical spine without aciute fracture   - Xray pelvis, knee and elbows without fractures   - PT/OT recommending home PT  - tylenol prn for pain.    #Rhabdomyolysis.   CK elevated to ~6000, UA with blood and no RBCs 2/2 fall. Started on IVF, CK downtrending prior to dc.     #Transaminitis   Suspect 2/2 rhabdo . Other etiology hepatic steatosis, no alcohol use    #2019 novel coronavirus disease (COVID-19).   Found to be COVID +, asymptomatic. Saturating well on RA, . Need for decadron or remdesivir  - vaccinatedx2 with booster.      Patient was discharged to:  [facility]  New medications:  none  Changes to old medications: none  Medications that were stopped: none    Physical exam at time of discharge:   CONSTITUTIONAL: Well groomed, no apparent distress  EYES: PERRLA and symmetric, EOMI, No conjunctival or scleral injection, non-icteric  ENMT: Oral mucosa with moist membranes. Normal dentition; no pharyngeal injection or exudates  NECK: Supple, symmetric and without tracheal deviation   RESP: No respiratory distress, no use of accessory muscles; CTA b/l, no WRR  CV: RRR, +S1S2, no MRG; no JVD; no peripheral edema  GI: Soft, NT, ND, no rebound, no guarding; no palpable masses; no hepatosplenomegaly; no hernia palpated  LYMPH: No cervical LAD or tenderness; no axillary LAD or tenderness; no inguinal LAD or tenderness  SKIN: No rashes or ulcers noted; no subcutaneous nodules or induration palpable   PSYCH: Appropriate insight/judgment; A+O x 3, mood and affect appropriate, recent/remote memory intact #Discharge: do not delete     76 y/o F with PMHx of psychiatric conditions (on trilafon, restoril, trazadone) and HLD BIBEMS from [facility] s/p mechanical fall, found to have acute rhabdomyolysis.     Problem List/Main Diagnoses (system-based):     #Fall.   s/p mechanical fall (rolled out of bed), no LOC or head trauma  - CT head without evidence of stroke, hemorrhage, or fracture  - CT cervical spine without aciute fracture   - Xray pelvis, knee and elbows without fractures   - PT/OT recommending home PT  - tylenol prn for pain.    #Rhabdomyolysis.   CK elevated to ~6000, UA with blood and no RBCs 2/2 fall. Started on IVF, CK downtrending prior to dc. Dced atorvastatin, started crestor    #Transaminitis   Suspect 2/2 rhabdo . Other etiology hepatic steatosis, no alcohol use    #2019 novel coronavirus disease (COVID-19).   Found to be COVID +, asymptomatic. Saturating well on RA, . Need for decadron or remdesivir  - vaccinatedx2 with booster.      Patient was discharged to:  [facility]  New medications:  crestor 5mg qHS  Changes to old medications: none  Medications that were stopped: atorvastatin    Physical exam at time of discharge:   CONSTITUTIONAL: Well groomed, no apparent distress  EYES: PERRLA and symmetric, EOMI, No conjunctival or scleral injection, non-icteric  ENMT: Oral mucosa with moist membranes. Normal dentition; no pharyngeal injection or exudates  NECK: Supple, symmetric and without tracheal deviation   RESP: No respiratory distress, no use of accessory muscles; CTA b/l, no WRR  CV: RRR, +S1S2, no MRG; no JVD; no peripheral edema  GI: Soft, NT, ND, no rebound, no guarding; no palpable masses; no hepatosplenomegaly; no hernia palpated  LYMPH: No cervical LAD or tenderness; no axillary LAD or tenderness; no inguinal LAD or tenderness  SKIN: No rashes or ulcers noted; no subcutaneous nodules or induration palpable   PSYCH: Appropriate insight/judgment; A+O x 3, mood and affect appropriate, recent/remote memory intact #Discharge: do not delete     78 y/o F with PMHx of psychiatric conditions (on trilafon, restoril, trazadone) and HLD BIBEMS from [facility] s/p mechanical fall, found to have acute rhabdomyolysis.     Problem List/Main Diagnoses (system-based):     #Fall.   s/p mechanical fall (rolled out of bed), no LOC or head trauma  - CT head without evidence of stroke, hemorrhage, or fracture  - CT cervical spine without aciute fracture   - Xray pelvis, knee and elbows without fractures   - PT/OT recommending home PT  - tylenol prn for pain.    #Rhabdomyolysis.   CK elevated to ~6000, UA with blood and no RBCs 2/2 fall. Started on IVF, CK downtrending prior to dc. Dced atorvastatin, started crestor    #Transaminitis   Suspect 2/2 rhabdo . Other etiology hepatic steatosis, no alcohol use    #2019 novel coronavirus disease (COVID-19).   Found to be COVID +, asymptomatic. Saturating well on RA, . No need for decadron or remdesivir. Previously vaccinatedx2 with booster.      Patient was discharged to:  [facility]  New medications:  crestor 5mg qHS  Changes to old medications: none  Medications that were stopped: atorvastatin    Physical exam at time of discharge:   CONSTITUTIONAL: Well groomed, no apparent distress  EYES: PERRLA and symmetric, EOMI, No conjunctival or scleral injection, non-icteric  ENMT: Oral mucosa with moist membranes. Normal dentition; no pharyngeal injection or exudates  NECK: Supple, symmetric and without tracheal deviation   RESP: No respiratory distress, no use of accessory muscles; CTA b/l, no WRR  CV: RRR, +S1S2, no MRG; no JVD; no peripheral edema  GI: Soft, NT, ND, no rebound, no guarding; no palpable masses; no hepatosplenomegaly; no hernia palpated  LYMPH: No cervical LAD or tenderness; no axillary LAD or tenderness; no inguinal LAD or tenderness  SKIN: No rashes or ulcers noted; no subcutaneous nodules or induration palpable   PSYCH: Appropriate insight/judgment; A+O x 3, mood and affect appropriate, recent/remote memory intact

## 2023-01-23 NOTE — DISCHARGE NOTE PROVIDER - NSDCMRMEDTOKEN_GEN_ALL_CORE_FT
Lipitor 20 mg oral tablet: 1 tab(s) orally once a day  Restoril 30 mg oral capsule: 1 cap(s) orally once a day (at bedtime)  traZODone 100 mg oral tablet: orally once a day (at bedtime)  Trilafon 8 mg oral tablet: 1 tab(s) orally 4 times a day   Crestor 5 mg oral tablet: 1 tab(s) orally once a day (at bedtime)   Restoril 30 mg oral capsule: 1 cap(s) orally once a day (at bedtime)  traZODone 100 mg oral tablet: orally once a day (at bedtime)  Trilafon 8 mg oral tablet: 1 tab(s) orally 4 times a day

## 2023-01-23 NOTE — DISCHARGE NOTE NURSING/CASE MANAGEMENT/SOCIAL WORK - NSDCVIVACCINE_GEN_ALL_CORE_FT
Tdap; 20-Jan-2023 12:10; Frantz Dalton (RN); [employer]; [id number] (Exp. Date: 08-Dec-2024); IntraMuscular; Deltoid Right.; 0.5 milliLiter(s); VIS (VIS Published: 09-May-2013, VIS Presented: 20-Jan-2023);

## 2023-01-24 VITALS
OXYGEN SATURATION: 98 % | SYSTOLIC BLOOD PRESSURE: 150 MMHG | RESPIRATION RATE: 17 BRPM | DIASTOLIC BLOOD PRESSURE: 87 MMHG | HEART RATE: 79 BPM | TEMPERATURE: 98 F

## 2023-01-24 PROCEDURE — 82550 ASSAY OF CK (CPK): CPT

## 2023-01-24 PROCEDURE — 73070 X-RAY EXAM OF ELBOW: CPT

## 2023-01-24 PROCEDURE — 84100 ASSAY OF PHOSPHORUS: CPT

## 2023-01-24 PROCEDURE — 99285 EMERGENCY DEPT VISIT HI MDM: CPT

## 2023-01-24 PROCEDURE — 73562 X-RAY EXAM OF KNEE 3: CPT

## 2023-01-24 PROCEDURE — 80053 COMPREHEN METABOLIC PANEL: CPT

## 2023-01-24 PROCEDURE — 87635 SARS-COV-2 COVID-19 AMP PRB: CPT

## 2023-01-24 PROCEDURE — 99232 SBSQ HOSP IP/OBS MODERATE 35: CPT

## 2023-01-24 PROCEDURE — 90471 IMMUNIZATION ADMIN: CPT

## 2023-01-24 PROCEDURE — 97161 PT EVAL LOW COMPLEX 20 MIN: CPT

## 2023-01-24 PROCEDURE — 85025 COMPLETE CBC W/AUTO DIFF WBC: CPT

## 2023-01-24 PROCEDURE — 83735 ASSAY OF MAGNESIUM: CPT

## 2023-01-24 PROCEDURE — 36415 COLL VENOUS BLD VENIPUNCTURE: CPT

## 2023-01-24 PROCEDURE — 70450 CT HEAD/BRAIN W/O DYE: CPT

## 2023-01-24 PROCEDURE — 72125 CT NECK SPINE W/O DYE: CPT

## 2023-01-24 PROCEDURE — 71045 X-RAY EXAM CHEST 1 VIEW: CPT

## 2023-01-24 PROCEDURE — 72170 X-RAY EXAM OF PELVIS: CPT

## 2023-01-24 PROCEDURE — 96360 HYDRATION IV INFUSION INIT: CPT

## 2023-01-24 PROCEDURE — 90715 TDAP VACCINE 7 YRS/> IM: CPT

## 2023-01-24 PROCEDURE — 96361 HYDRATE IV INFUSION ADD-ON: CPT

## 2023-01-24 PROCEDURE — 86803 HEPATITIS C AB TEST: CPT

## 2023-01-24 PROCEDURE — 81001 URINALYSIS AUTO W/SCOPE: CPT

## 2023-01-24 RX ORDER — ATORVASTATIN CALCIUM 80 MG/1
1 TABLET, FILM COATED ORAL
Qty: 0 | Refills: 0 | DISCHARGE

## 2023-01-24 RX ORDER — ROSUVASTATIN CALCIUM 5 MG/1
1 TABLET ORAL
Qty: 30 | Refills: 0
Start: 2023-01-24 | End: 2023-02-22

## 2023-01-24 RX ORDER — ROSUVASTATIN CALCIUM 5 MG/1
1 TABLET ORAL
Qty: 30 | Refills: 1
Start: 2023-01-24 | End: 2023-03-24

## 2023-01-24 RX ADMIN — PERPHENAZINE 8 MILLIGRAM(S): 8 TABLET, FILM COATED ORAL at 06:08

## 2023-01-24 RX ADMIN — PERPHENAZINE 8 MILLIGRAM(S): 8 TABLET, FILM COATED ORAL at 00:06

## 2023-01-24 NOTE — PROGRESS NOTE ADULT - ASSESSMENT
76 y/o F with PMHx of psychiatric conditions (on trilafon, restoril, trazadone) and HLD BIBEMS from [facility] s/p mechanical fall, found to have acute rhabdomyolysis.     
MOM regarding My Chart message. Asked her to call back and let us know if she still needs the z pack.
 76 y/o F with PMHx of psychiatric conditions (on trilafon, restoril, trazadone) and HLD BIBEMS from [facility] s/p mechanical fall, found to have acute rhabdomyolysis.     
 78 y/o F with PMHx of psychiatric conditions (on trilafon, restoril, trazadone) and HLD BIBEMS from [facility] s/p mechanical fall, found to have acute rhabdomyolysis.

## 2023-01-24 NOTE — PROGRESS NOTE ADULT - PROBLEM SELECTOR PLAN 6
F: NS @100  E; replete as needed, close monitoring in setting of rhabdo  N: regular   DVT: lovenox     Dispo: RMF F: none  E; replete as needed, close monitoring in setting of rhabdo  N: regular   DVT: lovenox     Dispo: RMF

## 2023-01-24 NOTE — PROGRESS NOTE ADULT - SUBJECTIVE AND OBJECTIVE BOX
**Incomplete Note**   CC: Patient is a 77y old  Female who presents with a chief complaint of Fall (24 Jan 2023 07:30)      INTERVAL EVENTS: ANIKA    SUBJECTIVE / INTERVAL HPI: Patient seen and examined at bedside. Patient denies any acute complaints. No fever, chills, chest pain, abdominal pain, nausea, vomiting.     ROS: negative unless otherwise stated above.    VITAL SIGNS:  Vital Signs Last 24 Hrs  T(C): 36.4 (24 Jan 2023 08:00), Max: 37.3 (23 Jan 2023 12:49)  T(F): 97.5 (24 Jan 2023 08:00), Max: 99.1 (23 Jan 2023 12:49)  HR: 79 (24 Jan 2023 08:00) (62 - 79)  BP: 150/87 (24 Jan 2023 08:00) (123/69 - 150/87)  BP(mean): 87 (24 Jan 2023 05:00) (87 - 87)  RR: 17 (24 Jan 2023 08:00) (17 - 18)  SpO2: 98% (24 Jan 2023 08:00) (95% - 98%)    Parameters below as of 24 Jan 2023 08:00  Patient On (Oxygen Delivery Method): room air          01-23-23 @ 07:01  -  01-24-23 @ 07:00  --------------------------------------------------------  IN: 1640 mL / OUT: 550 mL / NET: 1090 mL        PHYSICAL EXAM:  General: NAD, elderly female  HEENT: MMM  Neck: supple  Cardiovascular: +S1/S2; RRR  Respiratory: CTA B/L; no W/R/R  Gastrointestinal: soft, NT/ND  Extremities: WWP; no edema, clubbing or cyanosis  Vascular: 2+ radial, DP/PT pulses B/L  Neurological: AAOx3; no focal deficits    MEDICATIONS:  MEDICATIONS  (STANDING):  atorvastatin 20 milliGRAM(s) Oral at bedtime  enoxaparin Injectable 40 milliGRAM(s) SubCutaneous every 24 hours  lactated ringers. 1000 milliLiter(s) (100 mL/Hr) IV Continuous <Continuous>  perphenazine 8 milliGRAM(s) Oral every 6 hours  traZODone 100 milliGRAM(s) Oral at bedtime    MEDICATIONS  (PRN):  acetaminophen     Tablet .. 650 milliGRAM(s) Oral every 6 hours PRN Temp greater or equal to 38C (100.4F), Mild Pain (1 - 3)  temazepam 30 milliGRAM(s) Oral at bedtime PRN Insomnia      ALLERGIES:  Allergies    No Known Allergies    Intolerances        LABS:                        12.6   4.82  )-----------( 140      ( 23 Jan 2023 05:30 )             38.3     01-23    139  |  106  |  8   ----------------------------<  117<H>  4.5   |  24  |  0.70    Ca    8.8      23 Jan 2023 05:30  Phos  3.0     01-23  Mg     2.0     01-23    TPro  6.1  /  Alb  3.4  /  TBili  0.5  /  DBili  x   /  AST  145<H>  /  ALT  55<H>  /  AlkPhos  90  01-23        CAPILLARY BLOOD GLUCOSE          RADIOLOGY & ADDITIONAL TESTS: Reviewed.   CC: Patient is a 77y old  Female who presents with a chief complaint of Fall (24 Jan 2023 07:30)      INTERVAL EVENTS: ANIKA    SUBJECTIVE / INTERVAL HPI: Patient seen and examined at bedside. Patient denies any acute complaints. No fever, chills, chest pain, abdominal pain, nausea, vomiting.     ROS: negative unless otherwise stated above.    VITAL SIGNS:  Vital Signs Last 24 Hrs  T(C): 36.4 (24 Jan 2023 08:00), Max: 37.3 (23 Jan 2023 12:49)  T(F): 97.5 (24 Jan 2023 08:00), Max: 99.1 (23 Jan 2023 12:49)  HR: 79 (24 Jan 2023 08:00) (62 - 79)  BP: 150/87 (24 Jan 2023 08:00) (123/69 - 150/87)  BP(mean): 87 (24 Jan 2023 05:00) (87 - 87)  RR: 17 (24 Jan 2023 08:00) (17 - 18)  SpO2: 98% (24 Jan 2023 08:00) (95% - 98%)    Parameters below as of 24 Jan 2023 08:00  Patient On (Oxygen Delivery Method): room air          01-23-23 @ 07:01  -  01-24-23 @ 07:00  --------------------------------------------------------  IN: 1640 mL / OUT: 550 mL / NET: 1090 mL        PHYSICAL EXAM:  General: NAD, elderly female  HEENT: MMM  Neck: supple  Cardiovascular: +S1/S2; RRR  Respiratory: CTA B/L; no W/R/R  Gastrointestinal: soft, NT/ND  Extremities: WWP; no edema, clubbing or cyanosis  Vascular: 2+ radial, DP/PT pulses B/L  Neurological: AAOx3; no focal deficits  Gait: slow steady gait    MEDICATIONS:  MEDICATIONS  (STANDING):  atorvastatin 20 milliGRAM(s) Oral at bedtime  enoxaparin Injectable 40 milliGRAM(s) SubCutaneous every 24 hours  lactated ringers. 1000 milliLiter(s) (100 mL/Hr) IV Continuous <Continuous>  perphenazine 8 milliGRAM(s) Oral every 6 hours  traZODone 100 milliGRAM(s) Oral at bedtime    MEDICATIONS  (PRN):  acetaminophen     Tablet .. 650 milliGRAM(s) Oral every 6 hours PRN Temp greater or equal to 38C (100.4F), Mild Pain (1 - 3)  temazepam 30 milliGRAM(s) Oral at bedtime PRN Insomnia      ALLERGIES:  Allergies    No Known Allergies    Intolerances        LABS:                        12.6   4.82  )-----------( 140      ( 23 Jan 2023 05:30 )             38.3     01-23    139  |  106  |  8   ----------------------------<  117<H>  4.5   |  24  |  0.70    Ca    8.8      23 Jan 2023 05:30  Phos  3.0     01-23  Mg     2.0     01-23    TPro  6.1  /  Alb  3.4  /  TBili  0.5  /  DBili  x   /  AST  145<H>  /  ALT  55<H>  /  AlkPhos  90  01-23        CAPILLARY BLOOD GLUCOSE          RADIOLOGY & ADDITIONAL TESTS: Reviewed.

## 2023-01-24 NOTE — PROGRESS NOTE ADULT - PROBLEM SELECTOR PLAN 2
- CK elevated to ~6000, UA with blood and no RBCs  - 2/2 fall   - potassium wnl, Cr stable, exam benign       Plan   - continue with IVF, LR @100/hr   - monitor UOP, strict I/Os  - monitor CMP qd, watch for signs of renal injury or hyperkalemia   - encourage PO intake       #Transaminitis   - possibly 2/2 rhabdo   - other etiology hepatic steatosis, no alcohol use  - trend CMP - CK elevated to ~6000, UA with blood and no RBCs  - 2/2 fall   - potassium wnl, Cr stable, exam benign     Plan   - no further fluids necessary  - monitor UOP, strict I/Os  - monitor CMP qd, watch for signs of renal injury or hyperkalemia   - encourage PO intake       #Transaminitis   Suspect 2/2 rhabdo   - other etiology hepatic steatosis, no alcohol use  - trend CMP

## 2023-01-24 NOTE — PROGRESS NOTE ADULT - PROBLEM SELECTOR PLAN 1
s/p mechanical fall (rolled out of bed), no LOC or head trauma  - CT head without evidence of stroke, hemorrhage, or fracture  - CT cervical spine without aciute fracture   - Xray pelvis, knee and elbows without fractures     Plan   - PT/OT  - tylenol prn for pain s/p mechanical fall (rolled out of bed), no LOC or head trauma  - CT head without evidence of stroke, hemorrhage, or fracture  - CT cervical spine without aciute fracture   - Xray pelvis, knee and elbows without fractures     Plan   - PT/OT --> recommend home PT  - tylenol prn for pain

## 2023-01-24 NOTE — PROGRESS NOTE ADULT - PROBLEM SELECTOR PLAN 3
found to be COVID +, asymptomatic  - saturating well on RA,   - no need for decadron or remdesivir  - vaccinatedx2 with booster

## 2023-01-24 NOTE — PROGRESS NOTE ADULT - PROBLEM SELECTOR PLAN 5
- unknown psychiatric illness  - home medications include: trazodone 100mg, trilafon 8mg qid, restoril 30mg qhs   - continue home medications   - medications confirmed with [facility] Unknown psychiatric illness. On home medications include: trazodone 100mg, trilafon 8mg qid, restoril 30mg qhs. List confirmed with [facility].   - continue home medications

## 2023-01-24 NOTE — PROGRESS NOTE ADULT - PROBLEM SELECTOR PLAN 4
- continue with lipitor qd On home lipitor.  - switch to crestor 5mg, since crestor is water-soluble and patient presenting with rhabdo

## 2023-01-27 DIAGNOSIS — E78.5 HYPERLIPIDEMIA, UNSPECIFIED: ICD-10-CM

## 2023-01-27 DIAGNOSIS — M62.82 RHABDOMYOLYSIS: ICD-10-CM

## 2023-01-27 DIAGNOSIS — F99 MENTAL DISORDER, NOT OTHERWISE SPECIFIED: ICD-10-CM

## 2023-01-27 DIAGNOSIS — U07.1 COVID-19: ICD-10-CM

## 2023-02-16 ENCOUNTER — EMERGENCY (EMERGENCY)
Facility: HOSPITAL | Age: 78
LOS: 1 days | Discharge: HOME | End: 2023-02-16
Attending: EMERGENCY MEDICINE | Admitting: EMERGENCY MEDICINE
Payer: MEDICAID

## 2023-02-16 VITALS
HEIGHT: 63 IN | HEART RATE: 74 BPM | DIASTOLIC BLOOD PRESSURE: 61 MMHG | SYSTOLIC BLOOD PRESSURE: 95 MMHG | RESPIRATION RATE: 14 BRPM | WEIGHT: 125 LBS | OXYGEN SATURATION: 94 %

## 2023-02-16 VITALS
TEMPERATURE: 98 F | RESPIRATION RATE: 16 BRPM | DIASTOLIC BLOOD PRESSURE: 85 MMHG | SYSTOLIC BLOOD PRESSURE: 155 MMHG | HEART RATE: 63 BPM | OXYGEN SATURATION: 97 %

## 2023-02-16 DIAGNOSIS — U07.1 COVID-19: ICD-10-CM

## 2023-02-16 DIAGNOSIS — E78.5 HYPERLIPIDEMIA, UNSPECIFIED: ICD-10-CM

## 2023-02-16 DIAGNOSIS — R41.82 ALTERED MENTAL STATUS, UNSPECIFIED: ICD-10-CM

## 2023-02-16 LAB
ALBUMIN SERPL ELPH-MCNC: 3.8 G/DL — SIGNIFICANT CHANGE UP (ref 3.4–5)
ALP SERPL-CCNC: 119 U/L — SIGNIFICANT CHANGE UP (ref 40–120)
ALT FLD-CCNC: 32 U/L — SIGNIFICANT CHANGE UP (ref 12–42)
AMMONIA BLD-MCNC: 11 UMOL/L — SIGNIFICANT CHANGE UP (ref 11–32)
ANION GAP SERPL CALC-SCNC: 8 MMOL/L — LOW (ref 9–16)
APPEARANCE UR: CLEAR — SIGNIFICANT CHANGE UP
AST SERPL-CCNC: 35 U/L — SIGNIFICANT CHANGE UP (ref 15–37)
BILIRUB SERPL-MCNC: 0.5 MG/DL — SIGNIFICANT CHANGE UP (ref 0.2–1.2)
BILIRUB UR-MCNC: NEGATIVE — SIGNIFICANT CHANGE UP
BUN SERPL-MCNC: 6 MG/DL — LOW (ref 7–23)
CALCIUM SERPL-MCNC: 9.2 MG/DL — SIGNIFICANT CHANGE UP (ref 8.5–10.5)
CHLORIDE SERPL-SCNC: 103 MMOL/L — SIGNIFICANT CHANGE UP (ref 96–108)
CK MB BLD-MCNC: 1.81 % — SIGNIFICANT CHANGE UP
CK MB CFR SERPL CALC: 2.7 NG/ML — SIGNIFICANT CHANGE UP (ref 0.5–3.6)
CK SERPL-CCNC: 149 U/L — SIGNIFICANT CHANGE UP (ref 26–192)
CO2 SERPL-SCNC: 26 MMOL/L — SIGNIFICANT CHANGE UP (ref 22–31)
COLOR SPEC: YELLOW — SIGNIFICANT CHANGE UP
CREAT SERPL-MCNC: 0.78 MG/DL — SIGNIFICANT CHANGE UP (ref 0.5–1.3)
DIFF PNL FLD: NEGATIVE — SIGNIFICANT CHANGE UP
EGFR: 79 ML/MIN/1.73M2 — SIGNIFICANT CHANGE UP
FLUAV AG NPH QL: SIGNIFICANT CHANGE UP
FLUBV AG NPH QL: SIGNIFICANT CHANGE UP
GLUCOSE SERPL-MCNC: 111 MG/DL — HIGH (ref 70–99)
GLUCOSE UR QL: NEGATIVE — SIGNIFICANT CHANGE UP
HCT VFR BLD CALC: 38.4 % — SIGNIFICANT CHANGE UP (ref 34.5–45)
HGB BLD-MCNC: 12.6 G/DL — SIGNIFICANT CHANGE UP (ref 11.5–15.5)
INR BLD: 1.08 — SIGNIFICANT CHANGE UP (ref 0.88–1.16)
KETONES UR-MCNC: NEGATIVE — SIGNIFICANT CHANGE UP
LACTATE SERPL-SCNC: 1.9 MMOL/L — SIGNIFICANT CHANGE UP (ref 0.4–2)
LEUKOCYTE ESTERASE UR-ACNC: NEGATIVE — SIGNIFICANT CHANGE UP
LIDOCAIN IGE QN: 67 U/L — LOW (ref 73–393)
MAGNESIUM SERPL-MCNC: 2.1 MG/DL — SIGNIFICANT CHANGE UP (ref 1.6–2.6)
MCHC RBC-ENTMCNC: 31 PG — SIGNIFICANT CHANGE UP (ref 27–34)
MCHC RBC-ENTMCNC: 32.8 GM/DL — SIGNIFICANT CHANGE UP (ref 32–36)
MCV RBC AUTO: 94.3 FL — SIGNIFICANT CHANGE UP (ref 80–100)
NITRITE UR-MCNC: NEGATIVE — SIGNIFICANT CHANGE UP
NRBC # BLD: 0 /100 WBCS — SIGNIFICANT CHANGE UP (ref 0–0)
NT-PROBNP SERPL-SCNC: 464 PG/ML — HIGH
PCO2 BLDV: 44 MMHG — HIGH (ref 39–42)
PH BLDV: 7.42 — SIGNIFICANT CHANGE UP (ref 7.32–7.43)
PH UR: 7.5 — SIGNIFICANT CHANGE UP (ref 5–8)
PLATELET # BLD AUTO: 121 K/UL — LOW (ref 150–400)
PO2 BLDV: 35 MMHG — SIGNIFICANT CHANGE UP (ref 25–45)
POTASSIUM SERPL-MCNC: 4.3 MMOL/L — SIGNIFICANT CHANGE UP (ref 3.5–5.3)
POTASSIUM SERPL-SCNC: 4.3 MMOL/L — SIGNIFICANT CHANGE UP (ref 3.5–5.3)
PROT SERPL-MCNC: 7.1 G/DL — SIGNIFICANT CHANGE UP (ref 6.4–8.2)
PROT UR-MCNC: NEGATIVE MG/DL — SIGNIFICANT CHANGE UP
PROTHROM AB SERPL-ACNC: 12.7 SEC — SIGNIFICANT CHANGE UP (ref 10.5–13.4)
RBC # BLD: 4.07 M/UL — SIGNIFICANT CHANGE UP (ref 3.8–5.2)
RBC # FLD: 12.9 % — SIGNIFICANT CHANGE UP (ref 10.3–14.5)
RSV RNA NPH QL NAA+NON-PROBE: SIGNIFICANT CHANGE UP
SAO2 % BLDV: 64.9 % — LOW (ref 67–88)
SARS-COV-2 RNA SPEC QL NAA+PROBE: DETECTED
SODIUM SERPL-SCNC: 137 MMOL/L — SIGNIFICANT CHANGE UP (ref 132–145)
SP GR SPEC: 1.01 — SIGNIFICANT CHANGE UP (ref 1–1.03)
TSH SERPL-MCNC: 2.52 UIU/ML — SIGNIFICANT CHANGE UP (ref 0.36–3.74)
UROBILINOGEN FLD QL: 0.2 E.U./DL — SIGNIFICANT CHANGE UP
WBC # BLD: 7.35 K/UL — SIGNIFICANT CHANGE UP (ref 3.8–10.5)
WBC # FLD AUTO: 7.35 K/UL — SIGNIFICANT CHANGE UP (ref 3.8–10.5)

## 2023-02-16 PROCEDURE — 99285 EMERGENCY DEPT VISIT HI MDM: CPT

## 2023-02-16 PROCEDURE — 70450 CT HEAD/BRAIN W/O DYE: CPT | Mod: 26

## 2023-02-16 PROCEDURE — 71045 X-RAY EXAM CHEST 1 VIEW: CPT | Mod: 26

## 2023-02-16 RX ORDER — SODIUM CHLORIDE 9 MG/ML
1000 INJECTION INTRAMUSCULAR; INTRAVENOUS; SUBCUTANEOUS ONCE
Refills: 0 | Status: COMPLETED | OUTPATIENT
Start: 2023-02-16 | End: 2023-02-16

## 2023-02-16 RX ADMIN — SODIUM CHLORIDE 1000 MILLILITER(S): 9 INJECTION INTRAMUSCULAR; INTRAVENOUS; SUBCUTANEOUS at 14:28

## 2023-02-16 RX ADMIN — SODIUM CHLORIDE 1000 MILLILITER(S): 9 INJECTION INTRAMUSCULAR; INTRAVENOUS; SUBCUTANEOUS at 15:53

## 2023-02-16 NOTE — ED PROVIDER NOTE - PHYSICAL EXAMINATION
VITAL SIGNS: I have reviewed nursing notes and confirm.  CONSTITUTIONAL: Well-developed; well-nourished; in no acute distress.  SKIN: skin appears somewhat orange in color  HEAD: Normocephalic; atraumatic.  EYES: EOM intact; PERRL  ENT: nose appears normal  NECK: Supple  CARD: S1, S2 normal  RESP: No wheezes, rales or rhonchi.  ABD: soft; non-distended; non-tender  EXT: No defmormity  NEURO: Alert, oriented. Grossly unremarkable.  PSYCH: Cooperative, appropriate.

## 2023-02-16 NOTE — ED PROVIDER NOTE - OBJECTIVE STATEMENT
77-year-old female presenting from the nursing home due to altered mental status.  Per report from nursing home, they are concerned that she took all of her psychiatric medications at the same time which she is not supposed to be doing.  He reports she is more lethargic and less responsive than usual.  She does have a known psychiatric history.  The patient herself is unable to provide any history at this time secondary to her altered mental status.

## 2023-02-16 NOTE — ED ADULT NURSE NOTE - OBJECTIVE STATEMENT
Pt alert to verbal stimuli, Pt BIBEMS from assisted living facility for ftt since COVID-19 diagnosis 2 weeks ago, today she was altered after taking cogentin, Trazadone, Restoril, 911 was called due to unsteady gait and slurred speech. pt reports feeling tired. Aox2 Denies pain, placed on monitor,

## 2023-02-16 NOTE — ED ADULT NURSE NOTE - CHPI ED NUR SYMPTOMS NEG
Render Risk Assessment In Note?: no Detail Level: Simple Comment: Refill request for calcipotriene ointment and clobetasol solution no blurred vision

## 2023-02-16 NOTE — ED ADULT NURSE REASSESSMENT NOTE - NS ED NURSE REASSESS COMMENT FT1
This RN called to speak with [facility] staff to give update and make aware pt is coming back to facility.

## 2023-02-16 NOTE — ED PROVIDER NOTE - PATIENT PORTAL LINK FT
You can access the FollowMyHealth Patient Portal offered by [facility] by registering at the following website: [email or web address]. By joining [employer]’s FollowMyHealth portal, you will also be able to view your health information using other applications (apps) compatible with our system.

## 2023-02-16 NOTE — ED ADULT TRIAGE NOTE - CHIEF COMPLAINT QUOTE
BIBA from assisted living facility after declining after her COVID-19 diagnosis 2 weeks ago, today she was altered after taking cogentin, Trazadone, Restoril, 911 was called due to unsteady gait and slurred speech. pt reports feeling tired.

## 2023-02-16 NOTE — ED ADULT NURSE REASSESSMENT NOTE - NS ED NURSE REASSESS COMMENT FT1
Received patient from previous shift, sleeping comfortably in stretcher. Pt is rousable to verbal and tactile stimuli and is able to follow simple directions. Awaiting ambulance service to return her to snf. Safety precautions in place and maintained.

## 2023-02-16 NOTE — ED PROVIDER NOTE - CLINICAL SUMMARY MEDICAL DECISION MAKING FREE TEXT BOX
77-year-old female presenting with increased lethargy in the setting of taking several of her sedating psych meds at the same time.  An extensive work-up was conducted to evaluate for possible infection versus metabolic abnormality versus dehydration versus derangements versus brain mass.  Labs are all reassuring with no clear evidence of a reason why she would be lethargic.  I suspect this was indeed secondary to being overmedicated.  She has been awake and alert and appropriate throughout her ER course.  Given her negative work-up and the fact that she already lives in a nursing care facility, I feel she can be safely discharged to where she came from.

## 2023-02-22 LAB
CULTURE RESULTS: SIGNIFICANT CHANGE UP
CULTURE RESULTS: SIGNIFICANT CHANGE UP
SPECIMEN SOURCE: SIGNIFICANT CHANGE UP
SPECIMEN SOURCE: SIGNIFICANT CHANGE UP

## 2023-03-01 NOTE — ED PROVIDER NOTE - WR INTERPRETATION 1
[facility], [address] [facility]  [address] [facility] [address]  Dept: [phone]       Harvinder Samuel :1967 MRN:[id number]    2023 Time Session Began: 11:00am  Time Session Ended: 11:55am    This visit was performed via live interactive two-way Video visit with patient's verbal consent.   Clinician Location:Home.  Patient Location: Home.  Verified patient identity:  [x] Yes    Session Type:Family Therapy (86407)    Others Present: Wife    Intervention: Supportive    Suicide/Homicide/Violence Ideation: No    If Yes, explain: na    Chief complaint in patient's own words: \"communication.\"    Progress Note containing chief complaint and symptoms/problems related to the complaint:    (Data/Action/Response/Plan)    D:  Lisbet and  Kelsi participated in a follow up session today. Since our last session progress reported include being able to speak to each other.  Reported ongoing areas of challenge to include having a conversation without it escalating..     A: Explored progress and coping.  Did an enactment involving them discussing their plans for the coming weekend. Interjected when emotions getting high and when the couple would get off task    R: Harvinder presented as calm, cooperative and engaged in the session and motivated for change.  Client was alert and oriented x4.  Eye contact was appropriate. Speech was normal in rate, tone, and volume.  Motor activity was unremarkable.  Thought process was future oriented and goal directed.  Client denied any suicidal or homicidal ideation.    Mood appeared normal with congruent affect.     Clients were being respectful with one another providing a safe place to share openly and take risks. Clients were working collaboratively together to improve relationships and achieve common shared goals.      P:  Will meet with the couple on Thursday to work further on managing conflict..    Need for Community Resources Assessed: No    Resources Needed:  No    Primary Diagnosis: Adjustment disorder, unspecified type  (primary encounter diagnosis)    Treatment Plan: Unchanged    Discharge Plan: N/A    Next Appointment: 2 March at 2pm  MAYLIN Ledezma   CXR negative - No pneumothorax, No opacities, No free air

## 2024-08-19 NOTE — ED CDU PROVIDER INITIAL DAY NOTE - GASTROINTESTINAL NEGATIVE STATEMENT, MLM
19-Aug-2024 05:45
no abdominal pain, no bloating, no constipation, no diarrhea, no nausea and no vomiting.

## 2025-06-30 ENCOUNTER — INPATIENT (INPATIENT)
Facility: HOSPITAL | Age: 80
LOS: 6 days | Discharge: NURSING, REHAB OR LONG-TERM CARE | End: 2025-07-07
Attending: STUDENT IN AN ORGANIZED HEALTH CARE EDUCATION/TRAINING PROGRAM | Admitting: INTERNAL MEDICINE
Payer: COMMERCIAL

## 2025-06-30 VITALS
DIASTOLIC BLOOD PRESSURE: 81 MMHG | WEIGHT: 130.07 LBS | TEMPERATURE: 98 F | OXYGEN SATURATION: 98 % | RESPIRATION RATE: 18 BRPM | HEART RATE: 86 BPM | SYSTOLIC BLOOD PRESSURE: 124 MMHG

## 2025-06-30 LAB
ALBUMIN SERPL ELPH-MCNC: 4.3 G/DL — SIGNIFICANT CHANGE UP (ref 3.4–5)
ALP SERPL-CCNC: 127 U/L — HIGH (ref 40–120)
ALT FLD-CCNC: 40 U/L — SIGNIFICANT CHANGE UP (ref 12–42)
ANION GAP SERPL CALC-SCNC: 7 MMOL/L — LOW (ref 9–16)
APPEARANCE UR: CLEAR — SIGNIFICANT CHANGE UP
AST SERPL-CCNC: 29 U/L — SIGNIFICANT CHANGE UP (ref 15–37)
BASOPHILS # BLD AUTO: 0.03 K/UL — SIGNIFICANT CHANGE UP (ref 0–0.2)
BASOPHILS NFR BLD AUTO: 0.5 % — SIGNIFICANT CHANGE UP (ref 0–2)
BILIRUB SERPL-MCNC: 0.9 MG/DL — SIGNIFICANT CHANGE UP (ref 0.2–1.2)
BILIRUB UR-MCNC: NEGATIVE — SIGNIFICANT CHANGE UP
BUN SERPL-MCNC: 9 MG/DL — SIGNIFICANT CHANGE UP (ref 7–23)
CALCIUM SERPL-MCNC: 9.6 MG/DL — SIGNIFICANT CHANGE UP (ref 8.5–10.5)
CHLORIDE SERPL-SCNC: 105 MMOL/L — SIGNIFICANT CHANGE UP (ref 96–108)
CO2 SERPL-SCNC: 27 MMOL/L — SIGNIFICANT CHANGE UP (ref 22–31)
COLOR SPEC: YELLOW — SIGNIFICANT CHANGE UP
CREAT SERPL-MCNC: 0.76 MG/DL — SIGNIFICANT CHANGE UP (ref 0.5–1.3)
DIFF PNL FLD: NEGATIVE — SIGNIFICANT CHANGE UP
EGFR: 81 ML/MIN/1.73M2 — SIGNIFICANT CHANGE UP
EGFR: 81 ML/MIN/1.73M2 — SIGNIFICANT CHANGE UP
EOSINOPHIL # BLD AUTO: 0.01 K/UL — SIGNIFICANT CHANGE UP (ref 0–0.5)
EOSINOPHIL NFR BLD AUTO: 0.2 % — SIGNIFICANT CHANGE UP (ref 0–6)
GLUCOSE SERPL-MCNC: 126 MG/DL — HIGH (ref 70–99)
GLUCOSE UR QL: NEGATIVE MG/DL — SIGNIFICANT CHANGE UP
HCT VFR BLD CALC: 39.5 % — SIGNIFICANT CHANGE UP (ref 34.5–45)
HGB BLD-MCNC: 13.2 G/DL — SIGNIFICANT CHANGE UP (ref 11.5–15.5)
IMM GRANULOCYTES # BLD AUTO: 0.02 K/UL — SIGNIFICANT CHANGE UP (ref 0–0.07)
IMM GRANULOCYTES NFR BLD AUTO: 0.3 % — SIGNIFICANT CHANGE UP (ref 0–0.9)
KETONES UR QL: 15 MG/DL
LEUKOCYTE ESTERASE UR-ACNC: NEGATIVE — SIGNIFICANT CHANGE UP
LYMPHOCYTES # BLD AUTO: 0.55 K/UL — LOW (ref 1–3.3)
LYMPHOCYTES NFR BLD AUTO: 8.5 % — LOW (ref 13–44)
MAGNESIUM SERPL-MCNC: 2 MG/DL — SIGNIFICANT CHANGE UP (ref 1.6–2.6)
MCHC RBC-ENTMCNC: 30.6 PG — SIGNIFICANT CHANGE UP (ref 27–34)
MCHC RBC-ENTMCNC: 33.4 G/DL — SIGNIFICANT CHANGE UP (ref 32–36)
MCV RBC AUTO: 91.4 FL — SIGNIFICANT CHANGE UP (ref 80–100)
MONOCYTES # BLD AUTO: 0.54 K/UL — SIGNIFICANT CHANGE UP (ref 0–0.9)
MONOCYTES NFR BLD AUTO: 8.3 % — SIGNIFICANT CHANGE UP (ref 2–14)
NEUTROPHILS # BLD AUTO: 5.33 K/UL — SIGNIFICANT CHANGE UP (ref 1.8–7.4)
NEUTROPHILS NFR BLD AUTO: 82.2 % — HIGH (ref 43–77)
NITRITE UR-MCNC: NEGATIVE — SIGNIFICANT CHANGE UP
NRBC # BLD AUTO: 0 K/UL — SIGNIFICANT CHANGE UP (ref 0–0)
NRBC # FLD: 0 K/UL — SIGNIFICANT CHANGE UP (ref 0–0)
NRBC BLD AUTO-RTO: 0 /100 WBCS — SIGNIFICANT CHANGE UP (ref 0–0)
PCP SPEC-MCNC: SIGNIFICANT CHANGE UP
PH UR: 8.5 (ref 5–8)
PLATELET # BLD AUTO: 165 K/UL — SIGNIFICANT CHANGE UP (ref 150–400)
PMV BLD: 11.5 FL — SIGNIFICANT CHANGE UP (ref 7–13)
POTASSIUM SERPL-MCNC: 3.9 MMOL/L — SIGNIFICANT CHANGE UP (ref 3.5–5.3)
POTASSIUM SERPL-SCNC: 3.9 MMOL/L — SIGNIFICANT CHANGE UP (ref 3.5–5.3)
PROT SERPL-MCNC: 7.3 G/DL — SIGNIFICANT CHANGE UP (ref 6.4–8.2)
PROT UR-MCNC: ABNORMAL MG/DL
RBC # BLD: 4.32 M/UL — SIGNIFICANT CHANGE UP (ref 3.8–5.2)
RBC # FLD: 12.4 % — SIGNIFICANT CHANGE UP (ref 10.3–14.5)
SODIUM SERPL-SCNC: 139 MMOL/L — SIGNIFICANT CHANGE UP (ref 132–145)
SP GR SPEC: 1.01 — SIGNIFICANT CHANGE UP (ref 1–1.03)
TSH SERPL-MCNC: 1.24 UIU/ML — SIGNIFICANT CHANGE UP (ref 0.36–3.74)
UROBILINOGEN FLD QL: 0.2 MG/DL — SIGNIFICANT CHANGE UP (ref 0.2–1)
WBC # BLD: 6.48 K/UL — SIGNIFICANT CHANGE UP (ref 3.8–10.5)
WBC # FLD AUTO: 6.48 K/UL — SIGNIFICANT CHANGE UP (ref 3.8–10.5)

## 2025-06-30 PROCEDURE — 70450 CT HEAD/BRAIN W/O DYE: CPT | Mod: 26

## 2025-06-30 PROCEDURE — 99285 EMERGENCY DEPT VISIT HI MDM: CPT

## 2025-06-30 RX ORDER — LORAZEPAM 4 MG/ML
0.5 VIAL (ML) INJECTION ONCE
Refills: 0 | Status: DISCONTINUED | OUTPATIENT
Start: 2025-06-30 | End: 2025-06-30

## 2025-06-30 RX ADMIN — Medication 1000 MILLILITER(S): at 13:06

## 2025-06-30 RX ADMIN — Medication 0.5 MILLIGRAM(S): at 20:16

## 2025-06-30 NOTE — ED ADULT TRIAGE NOTE - CHIEF COMPLAINT QUOTE
pt christine ems from shelter c/o "shaky legs" for 2 months. per ems shelter staff member called for her. walks with assistance.

## 2025-06-30 NOTE — ED ADULT NURSE NOTE - NSFALLUNIVINTERV_ED_ALL_ED
Bed/Stretcher in lowest position, wheels locked, appropriate side rails in place/Call bell, personal items and telephone in reach/Instruct patient to call for assistance before getting out of bed/chair/stretcher/Non-slip footwear applied when patient is off stretcher/[place] to call system/Physically safe environment - no spills, clutter or unnecessary equipment/Purposeful proactive rounding/Room/bathroom lighting operational, light cord in reach

## 2025-06-30 NOTE — ED ADULT NURSE REASSESSMENT NOTE - NS ED NURSE REASSESS COMMENT FT1
20grac initiated and labs sent  Fluids initiated on patient as ordered.   Pt reporting needing to go to restroom  ERT to put pt in RM 13 on purewhick as patient refused bed pan and cannot ambulate
Pt received from previous RN. care assumed. pt is resting on stretcher, no distress. denies pain. aox4. pending ems transport to inpatient unit. provided with food. denies other needs. aox4. safety maintained.

## 2025-06-30 NOTE — ED PROVIDER NOTE - PROGRESS NOTE DETAILS
normal work up including labs, UA, HCT. walk trial unsuccesful, will admit for PT/social eval. Will likely need assistance w ADLs. baseline does not use a cane or walker. Presented admission to internal medicine [facility] for workup of unsteady gait and likely needing PT/rehab.  Accepted by internal medicine attending.

## 2025-06-30 NOTE — ED PROVIDER NOTE - CLINICAL SUMMARY MEDICAL DECISION MAKING FREE TEXT BOX
HPI this is a female patient with history of schizophrenia NOS, currently on Restoril, Trilafon, trazodone, Ingrezza, oxybutynin, Lipitor.  Patient presented for evaluation from [facility] for increased urinary frequency, generalized weakness, shaky legs today.  Usually walks without assistance.  Otherwise denies any fever chills chest pain shortness of breath abdominal pain nausea vomiting diarrhea or any constipation or any other symptoms.    EKG is a normal sinus rhythm with a heart rate of 65, no ST-T changes, areas of poor baseline due to movement.    On examination patient is alert and oriented and able to viral history.  Lung sounds normal, heart sounds normal, abdomen nontender.  Bilateral lower extremities without any swelling or edema.  Normal neurological examination with no focal deficits, normal strength in all extremities.  Gait not assessed on patient evaluation.    MDM will workup for generalized weakness and unsteady gait and dysuria.  Will get basic labs, UA, head CT, will treat supportively and reassess.

## 2025-06-30 NOTE — ED ADULT TRIAGE NOTE - RESPIRATORY RATE (BREATHS/MIN)
Reason for Call:  Form, our goal is to have forms completed with 72 hours, however, some forms may require a visit or additional information.    Type of letter, form or note:  medical    Who is the form from?:  (if other please explain)    Where did the form come from: Patient or family brought in       What clinic location was the form placed at?: [facility] [phone]    Where the form was placed: Team A Box/Folder    What number is listed as a contact on the form?: [phone]       Additional comments: Please complete form and fax to [phone]    Call taken on 2/3/2022 at 10:45 AM by Lenore Castañeda     18

## 2025-06-30 NOTE — ED ADULT NURSE NOTE - OBJECTIVE STATEMENT
pt bib ems from shelter c/o "shaky legs" for 2 months. per ems shelter staff member called for her. walks with assistance.  Patient reported that she has been getting progressively weaker in her legs to the point that she cannot walk  Denies trauma or pain  No further complaints

## 2025-07-01 DIAGNOSIS — R30.0 DYSURIA: ICD-10-CM

## 2025-07-01 DIAGNOSIS — E78.5 HYPERLIPIDEMIA, UNSPECIFIED: ICD-10-CM

## 2025-07-01 DIAGNOSIS — Z29.9 ENCOUNTER FOR PROPHYLACTIC MEASURES, UNSPECIFIED: ICD-10-CM

## 2025-07-01 DIAGNOSIS — R35.0 FREQUENCY OF MICTURITION: ICD-10-CM

## 2025-07-01 DIAGNOSIS — R53.1 WEAKNESS: ICD-10-CM

## 2025-07-01 DIAGNOSIS — F20.9 SCHIZOPHRENIA, UNSPECIFIED: ICD-10-CM

## 2025-07-01 DIAGNOSIS — R26.81 UNSTEADINESS ON FEET: ICD-10-CM

## 2025-07-01 LAB
A1C WITH ESTIMATED AVERAGE GLUCOSE RESULT: 5.4 % — SIGNIFICANT CHANGE UP (ref 4–5.6)
ANION GAP SERPL CALC-SCNC: 14 MMOL/L — SIGNIFICANT CHANGE UP (ref 5–17)
BASOPHILS # BLD AUTO: 0.03 K/UL — SIGNIFICANT CHANGE UP (ref 0–0.2)
BASOPHILS NFR BLD AUTO: 0.4 % — SIGNIFICANT CHANGE UP (ref 0–2)
BUN SERPL-MCNC: 10 MG/DL — SIGNIFICANT CHANGE UP (ref 7–23)
CALCIUM SERPL-MCNC: 9.2 MG/DL — SIGNIFICANT CHANGE UP (ref 8.4–10.5)
CHLORIDE SERPL-SCNC: 101 MMOL/L — SIGNIFICANT CHANGE UP (ref 96–108)
CO2 SERPL-SCNC: 18 MMOL/L — LOW (ref 22–31)
CREAT SERPL-MCNC: 0.6 MG/DL — SIGNIFICANT CHANGE UP (ref 0.5–1.3)
EGFR: 92 ML/MIN/1.73M2 — SIGNIFICANT CHANGE UP
EGFR: 92 ML/MIN/1.73M2 — SIGNIFICANT CHANGE UP
EOSINOPHIL # BLD AUTO: 0.02 K/UL — SIGNIFICANT CHANGE UP (ref 0–0.5)
EOSINOPHIL NFR BLD AUTO: 0.3 % — SIGNIFICANT CHANGE UP (ref 0–6)
ESTIMATED AVERAGE GLUCOSE: 108 MG/DL — SIGNIFICANT CHANGE UP (ref 68–114)
GLUCOSE SERPL-MCNC: 233 MG/DL — HIGH (ref 70–99)
HCT VFR BLD CALC: 41 % — SIGNIFICANT CHANGE UP (ref 34.5–45)
HGB BLD-MCNC: 13.5 G/DL — SIGNIFICANT CHANGE UP (ref 11.5–15.5)
IMM GRANULOCYTES # BLD AUTO: 0.01 K/UL — SIGNIFICANT CHANGE UP (ref 0–0.07)
IMM GRANULOCYTES NFR BLD AUTO: 0.1 % — SIGNIFICANT CHANGE UP (ref 0–0.9)
LYMPHOCYTES # BLD AUTO: 0.63 K/UL — LOW (ref 1–3.3)
LYMPHOCYTES NFR BLD AUTO: 8.9 % — LOW (ref 13–44)
MAGNESIUM SERPL-MCNC: 1.9 MG/DL — SIGNIFICANT CHANGE UP (ref 1.6–2.6)
MCHC RBC-ENTMCNC: 30.3 PG — SIGNIFICANT CHANGE UP (ref 27–34)
MCHC RBC-ENTMCNC: 32.9 G/DL — SIGNIFICANT CHANGE UP (ref 32–36)
MCV RBC AUTO: 92.1 FL — SIGNIFICANT CHANGE UP (ref 80–100)
MONOCYTES # BLD AUTO: 0.54 K/UL — SIGNIFICANT CHANGE UP (ref 0–0.9)
MONOCYTES NFR BLD AUTO: 7.7 % — SIGNIFICANT CHANGE UP (ref 2–14)
NEUTROPHILS # BLD AUTO: 5.82 K/UL — SIGNIFICANT CHANGE UP (ref 1.8–7.4)
NEUTROPHILS NFR BLD AUTO: 82.6 % — HIGH (ref 43–77)
NRBC # BLD AUTO: 0 K/UL — SIGNIFICANT CHANGE UP (ref 0–0)
NRBC # FLD: 0 K/UL — SIGNIFICANT CHANGE UP (ref 0–0)
NRBC BLD AUTO-RTO: 0 /100 WBCS — SIGNIFICANT CHANGE UP (ref 0–0)
PHOSPHATE SERPL-MCNC: 2.2 MG/DL — LOW (ref 2.5–4.5)
PLATELET # BLD AUTO: 186 K/UL — SIGNIFICANT CHANGE UP (ref 150–400)
PMV BLD: 11.5 FL — SIGNIFICANT CHANGE UP (ref 7–13)
POTASSIUM SERPL-MCNC: 4.1 MMOL/L — SIGNIFICANT CHANGE UP (ref 3.5–5.3)
POTASSIUM SERPL-SCNC: 4.1 MMOL/L — SIGNIFICANT CHANGE UP (ref 3.5–5.3)
RBC # BLD: 4.45 M/UL — SIGNIFICANT CHANGE UP (ref 3.8–5.2)
RBC # FLD: 12.2 % — SIGNIFICANT CHANGE UP (ref 10.3–14.5)
SODIUM SERPL-SCNC: 133 MMOL/L — LOW (ref 135–145)
WBC # BLD: 7.05 K/UL — SIGNIFICANT CHANGE UP (ref 3.8–10.5)
WBC # FLD AUTO: 7.05 K/UL — SIGNIFICANT CHANGE UP (ref 3.8–10.5)

## 2025-07-01 PROCEDURE — 99223 1ST HOSP IP/OBS HIGH 75: CPT

## 2025-07-01 RX ORDER — PERPHENAZINE 2 MG/1
32 TABLET, FILM COATED ORAL
Refills: 0 | Status: DISCONTINUED | OUTPATIENT
Start: 2025-07-01 | End: 2025-07-07

## 2025-07-01 RX ORDER — VALBENAZINE 80 MG/1
40 CAPSULE ORAL DAILY
Refills: 0 | Status: DISCONTINUED | OUTPATIENT
Start: 2025-07-01 | End: 2025-07-07

## 2025-07-01 RX ORDER — TEMAZEPAM 15 MG/1
15 CAPSULE ORAL AT BEDTIME
Refills: 0 | Status: DISCONTINUED | OUTPATIENT
Start: 2025-07-01 | End: 2025-07-07

## 2025-07-01 RX ORDER — ATORVASTATIN CALCIUM 80 MG/1
20 TABLET, FILM COATED ORAL AT BEDTIME
Refills: 0 | Status: DISCONTINUED | OUTPATIENT
Start: 2025-07-01 | End: 2025-07-07

## 2025-07-01 RX ORDER — PHENAZOPYRIDINE HCL 100 MG
100 TABLET ORAL EVERY 8 HOURS
Refills: 0 | Status: COMPLETED | OUTPATIENT
Start: 2025-07-01 | End: 2025-07-04

## 2025-07-01 RX ORDER — ATORVASTATIN CALCIUM 80 MG/1
20 TABLET, FILM COATED ORAL DAILY
Refills: 0 | Status: DISCONTINUED | OUTPATIENT
Start: 2025-07-01 | End: 2025-07-01

## 2025-07-01 RX ORDER — PERPHENAZINE 2 MG/1
8 TABLET, FILM COATED ORAL AT BEDTIME
Refills: 0 | Status: DISCONTINUED | OUTPATIENT
Start: 2025-07-01 | End: 2025-07-07

## 2025-07-01 RX ORDER — ACETAMINOPHEN 500 MG/5ML
650 LIQUID (ML) ORAL EVERY 6 HOURS
Refills: 0 | Status: DISCONTINUED | OUTPATIENT
Start: 2025-07-01 | End: 2025-07-07

## 2025-07-01 RX ORDER — ENOXAPARIN SODIUM 100 MG/ML
40 INJECTION SUBCUTANEOUS EVERY 24 HOURS
Refills: 0 | Status: DISCONTINUED | OUTPATIENT
Start: 2025-07-01 | End: 2025-07-07

## 2025-07-01 RX ORDER — PHENAZOPYRIDINE HCL 100 MG
100 TABLET ORAL ONCE
Refills: 0 | Status: DISCONTINUED | OUTPATIENT
Start: 2025-07-01 | End: 2025-07-01

## 2025-07-01 RX ORDER — TRAZODONE HCL 100 MG
100 TABLET ORAL AT BEDTIME
Refills: 0 | Status: DISCONTINUED | OUTPATIENT
Start: 2025-07-01 | End: 2025-07-07

## 2025-07-01 RX ORDER — PHENAZOPYRIDINE HCL 100 MG
100 TABLET ORAL
Refills: 0 | Status: DISCONTINUED | OUTPATIENT
Start: 2025-07-01 | End: 2025-07-01

## 2025-07-01 RX ADMIN — ATORVASTATIN CALCIUM 20 MILLIGRAM(S): 80 TABLET, FILM COATED ORAL at 21:58

## 2025-07-01 RX ADMIN — PERPHENAZINE 32 MILLIGRAM(S): 2 TABLET, FILM COATED ORAL at 10:20

## 2025-07-01 RX ADMIN — ENOXAPARIN SODIUM 40 MILLIGRAM(S): 100 INJECTION SUBCUTANEOUS at 21:56

## 2025-07-01 RX ADMIN — VALBENAZINE 40 MILLIGRAM(S): 80 CAPSULE ORAL at 10:20

## 2025-07-01 RX ADMIN — Medication 100 MILLIGRAM(S): at 13:36

## 2025-07-01 RX ADMIN — PERPHENAZINE 8 MILLIGRAM(S): 2 TABLET, FILM COATED ORAL at 21:56

## 2025-07-01 RX ADMIN — Medication 100 MILLIGRAM(S): at 21:57

## 2025-07-01 NOTE — OCCUPATIONAL THERAPY INITIAL EVALUATION ADULT - DIAGNOSIS, OT EVAL
Pt presents with generalized deconditioning, impaired balance, and BUE tremors impacting overall safety and ability to perform ADLs and functional mobility tasks at [facility].

## 2025-07-01 NOTE — OCCUPATIONAL THERAPY INITIAL EVALUATION ADULT - PERTINENT HX OF CURRENT PROBLEM, REHAB EVAL
Pt is a 75 yo F PMH schizophrenia, HLD, admitted for weakness, unsteady gait and urinary incontinence/ frequency. She came to the ED from [facility], and reports a 2 month hx of feeling unsteady in her legs, unable to stand or walk, with tremors. She previously was able to walk without much difficulty, reports that she was not using a walker or cane. No inciting events or traumas. She denies headache, dizziness, changes in vision or hearing. She also endorses a 2 month hx of urinary incontinence, and was started on Oxybutynin 2 weeks ago, which she no longer takes stating that it worsens her symptoms. She endorses not being able to make it to the bathroom in time, sometimes urinating 16x/ hour. She reports normal appetite, eating three meals per day, and does not feel excessively thirsty. Otherwise she denies F/C/ N/V diarrhea, constipation, bowel incontinence, back pain, numbness or tingling of lower extremities, dysuria, hematuria, hx of UTI's.

## 2025-07-01 NOTE — PHYSICAL THERAPY INITIAL EVALUATION ADULT - IMPAIRMENTS CONTRIBUTING IMPAIRED BED MOBILITY, REHAB EVAL
bilateral UE tremors at rest and with movement/impaired balance/impaired postural control/decreased strength

## 2025-07-01 NOTE — OCCUPATIONAL THERAPY INITIAL EVALUATION ADULT - GENERAL OBSERVATIONS, REHAB EVAL
Pt received semi-supine in bed, +heplock, +primafit, in NAD and agreeable to OT. Cleared by RUBENS Cortes to see. PT Mayra present.

## 2025-07-01 NOTE — PATIENT PROFILE ADULT - NSPROPTRIGHTSUPPORTPERSON_GEN_A_NUR
"   Zahida Blanchard NP   [place] [address]  [place]  [address]      PATIENT NAME: Mally Woods  : 1962  DATE: 3/22/23  MRN: [id number]      Billing Provider: Zahida Blanchard NP  Level of Service: GA OFFICE/OUTPT VISIT, EST, LEVL III, 20-29 MIN  Patient PCP Information       Provider PCP Type    Zahida Blanchard NP General            Reason for Visit / Chief Complaint: Cough (Productive cough, congestion, started Sat. Pt states she doesn't feel terrible but just "doesn't feel good". Coughs so much she cannot hardly sleep at night. )       Update PCP  Update Chief Complaint         History of Present Illness / Problem Focused Workflow     Mally Woods presents to the clinic with Cough (Productive cough, congestion, started Sat. Pt states she doesn't feel terrible but just "doesn't feel good". Coughs so much she cannot hardly sleep at night. )     60 year old female presents to clinic with complaints of productive cough with white sputum production, sinus congestion x 3 days. Denies fever. States cough is worse at night.    Review of Systems     @Review of Systems   Constitutional:  Negative for activity change, appetite change, fatigue and fever.   HENT:  Positive for nasal congestion, rhinorrhea and sinus pressure/congestion. Negative for ear pain and sore throat.    Eyes:  Negative for pain, redness, visual disturbance and eye dryness.   Respiratory:  Positive for cough. Negative for shortness of breath.    Cardiovascular:  Negative for chest pain and leg swelling.   Gastrointestinal:  Negative for abdominal distention, abdominal pain, constipation and diarrhea.   Endocrine: Negative for cold intolerance, heat intolerance and polyuria.   Genitourinary:  Negative for bladder incontinence, dysuria, frequency and urgency.   Musculoskeletal:  Negative for arthralgias, gait problem and myalgias.   Integumentary:  Negative for color change, rash and wound.   Allergic/Immunologic: " Negative for environmental allergies and food allergies.   Neurological:  Negative for dizziness, weakness, light-headedness and headaches.   Psychiatric/Behavioral:  Negative for behavioral problems and sleep disturbance.      Medical / Social / Family History     Past Medical History:   Diagnosis Date    Acute superficial gastritis without hemorrhage 10/21/2021    Anxiety     Aortic aneurysm 11/04/2020    w/o rupture    Cervical disc disorder     Chest pain in adult 10/21/2021    Depression     GERD (gastroesophageal reflux disease)     History of transient ischemic attack (TIA) 11/04/2020    Hyperlipidemia     Hypertension     Stroke     Thoracic aortic aneurysm     Vitamin deficiency        Past Surgical History:   Procedure Laterality Date    CARDIAC CATHETERIZATION Left 07/31/2008    Performed by Dr. Bruce Cramer    CARPAL TUNNEL RELEASE  12/21/2017    Dr. Moran with cyst removed from left middle finger    CHOLECYSTECTOMY      COLONOSCOPY      DIAGNOSTIC LAPAROSCOPY      DILATION AND CURETTAGE OF UTERUS      EGD, WITH BALLOON DILATION  2018    Performed by Dr. Marc Lomax    TOTAL REDUCTION MAMMOPLASTY         Social History  Ms.  reports that she has never smoked. She has been exposed to tobacco smoke. She has never used smokeless tobacco. She reports that she does not drink alcohol and does not use drugs.    Family History  Ms.'s family history includes Cervical cancer in her mother; Dementia in her father; Diabetes in her mother; Glaucoma in her mother; Hyperlipidemia in her father and mother; Hypertension in her father and mother; No Known Problems in her sister; Pancreatic cancer in her mother.    Medications and Allergies     Medications  Outpatient Medications Marked as Taking for the 3/22/23 encounter (Office Visit) with Zahida Blanchard NP   Medication Sig Dispense Refill    cyanocobalamin (VITAMIN B-12) 1000 MCG tablet 1 tablet.      ergocalciferol (ERGOCALCIFEROL) 50,000 unit Cap TAKE 1  CAPSULE BY MOUTH TWICE WEEKLY AS DIRECTED 14 capsule 0    vit c-ascorbate Ca-ascorb sod (VITAMIN C) 500 mg/15 mL Liqd as directed      [DISCONTINUED] lisinopriL (PRINIVIL,ZESTRIL) 2.5 MG tablet Take 1 tablet (2.5 mg total) by mouth 2 (two) times daily. 90 tablet 0    [DISCONTINUED] lisinopriL (PRINIVIL,ZESTRIL) 5 MG tablet TAKE ONE TABLET BY MOUTH ONCE DAILY FOR high blood PRESSURE 90 tablet 0    [DISCONTINUED] pantoprazole (PROTONIX) 40 MG tablet Take 1 tablet (40 mg total) by mouth every morning. 90 tablet 0     Current Facility-Administered Medications for the 3/22/23 encounter (Office Visit) with Zahida Blanchard NP   Medication Dose Route Frequency Provider Last Rate Last Admin    [COMPLETED] cefTRIAXone injection 1 g  1 g Intramuscular 1 time in Clinic/HOD Zahida Blanchard NP   1 g at 03/22/23 1109    [COMPLETED] methylPREDNISolone acetate injection 40 mg  40 mg Intramuscular 1 time in Clinic/HOD Zahida Blanchard NP   40 mg at 03/22/23 1109       Allergies  Review of patient's allergies indicates:   Allergen Reactions    Penicillins Hives and Rash     Can take rocephin  Other reaction(s): > 10 years ago  Can take rocephin      Celecoxib     Fentanyl Other (See Comments)     Extreme sedation  Other reaction(s): Other (See Comments)  Extreme sedation  Extreme sedation      Hydrochlorothiazide Other (See Comments)     Reaction unknown  Other reaction(s): Other (See Comments)  Reaction unknown  Reaction unknown    Promethazine hcl Other (See Comments)     Hallucinations    Betamethasone Other (See Comments) and Rash     Flushing  Flushing  Flushing    Hydromorphone hcl Palpitations    Latex Rash    Morphine Palpitations    Sulfa (sulfonamide antibiotics) Itching       Physical Examination     Vitals:    03/22/23 1006   BP: 132/78   Pulse: 73   Resp: 18   Temp: 97.5 °F (36.4 °C)     Physical Exam  Vitals and nursing note reviewed.   Constitutional:       Appearance: Normal appearance.   HENT:      Head:  Normocephalic.      Right Ear: Tympanic membrane normal.      Left Ear: Tympanic membrane normal.      Nose: Congestion and rhinorrhea present. Rhinorrhea is purulent.      Right Turbinates: Pale.      Left Turbinates: Pale.      Right Sinus: Maxillary sinus tenderness and frontal sinus tenderness present.      Left Sinus: Maxillary sinus tenderness and frontal sinus tenderness present.      Mouth/Throat:      Lips: Pink.      Mouth: Mucous membranes are moist.      Pharynx: Oropharyngeal exudate (clear post nasal drainage.) and posterior oropharyngeal erythema present.   Eyes:      Conjunctiva/sclera: Conjunctivae normal.   Cardiovascular:      Rate and Rhythm: Normal rate and regular rhythm.      Pulses: Normal pulses.      Heart sounds: Normal heart sounds.   Pulmonary:      Effort: Pulmonary effort is normal.      Breath sounds: Normal breath sounds. No wheezing or rhonchi.   Abdominal:      General: Abdomen is flat. Bowel sounds are normal. There is no distension.      Palpations: Abdomen is soft.      Tenderness: There is no abdominal tenderness.   Musculoskeletal:         General: Normal range of motion.      Cervical back: Normal range of motion.   Skin:     General: Skin is warm and dry.      Capillary Refill: Capillary refill takes less than 2 seconds.   Neurological:      General: No focal deficit present.      Mental Status: She is alert and oriented to person, place, and time. Mental status is at baseline.   Psychiatric:         Mood and Affect: Mood normal.         Behavior: Behavior normal.             Lab Results   Component Value Date    WBC 6.44 02/28/2023    HGB 12.5 02/28/2023    HCT 38.1 02/28/2023    MCV 90.1 02/28/2023     02/28/2023          Sodium   Date Value Ref Range Status   02/28/2023 139 136 - 145 mmol/L Final     Potassium   Date Value Ref Range Status   02/28/2023 4.2 3.5 - 5.1 mmol/L Final     Chloride   Date Value Ref Range Status   02/28/2023 105 98 - 107 mmol/L Final      CO2   Date Value Ref Range Status   02/28/2023 24 21 - 32 mmol/L Final     Glucose   Date Value Ref Range Status   02/28/2023 102 74 - 106 mg/dL Final     BUN   Date Value Ref Range Status   02/28/2023 13 7 - 18 mg/dL Final     Creatinine   Date Value Ref Range Status   02/28/2023 0.78 0.55 - 1.02 mg/dL Final     Calcium   Date Value Ref Range Status   02/28/2023 9.0 8.5 - 10.1 mg/dL Final     Total Protein   Date Value Ref Range Status   02/28/2023 7.0 6.4 - 8.2 g/dL Final     Albumin   Date Value Ref Range Status   02/28/2023 3.6 3.5 - 5.0 g/dL Final     Bilirubin, Total   Date Value Ref Range Status   02/28/2023 0.4 >0.0 - 1.2 mg/dL Final     Alk Phos   Date Value Ref Range Status   02/28/2023 92 46 - 118 U/L Final     AST   Date Value Ref Range Status   02/28/2023 24 15 - 37 U/L Final     ALT   Date Value Ref Range Status   02/28/2023 34 13 - 56 U/L Final     Anion Gap   Date Value Ref Range Status   02/28/2023 14 7 - 16 mmol/L Final     eGFR    Date Value Ref Range Status   03/23/2021 85  Final     eGFR   Date Value Ref Range Status   05/23/2022 81 >=60 mL/min/1.73m² Final      US Abdomen Limited  Narrative: EXAMINATION:  US ABDOMEN LIMITED    CLINICAL HISTORY:  Fatty (change of) liver, not elsewhere classified    TECHNIQUE:  Limited ultrasound of the right upper quadrant of the abdomen  was performed.  Ultrasound images captured and stored.    COMPARISON:  None.    FINDINGS:  Liver: Normal in size, measuring 20.4 cm. Fatty liver infiltration. No focal hepatic lesions.    Gallbladder: The gallbladder is surgically absent.    Biliary system: The common duct is not dilated, measuring 6.8 mm.  No intrahepatic ductal dilatation.    Right kidney: Normal in size and echotexture, measuring 12.3 cm.  Simple cyst lower pole.    Miscellaneous: No upper abdominal ascites.  Impression: Fatty infiltration of the liver.    Electronically signed by: Po Valente  Date:    03/20/2023  Time:    09:35      Procedures   Assessment and Plan (including Health Maintenance)      Problem List  Smart Sets  Document Outside HM   :    Plan:           Problem List Items Addressed This Visit          ENT    Acute non-recurrent pansinusitis - Primary    Current Assessment & Plan     Rocephin and Depomedrol given IM in clinic.   Zithromax as ordered and Ninjacof as needed.    Reviewed pathology of current symptoms, medication side effects/risk/benefits/directions on taking medications, and worsening or persistent symptoms that require follow up in next 2-3 days. Saline/steroid nasal sprays, antihistamine use, increase fluid intake, and multivitamin/elderberry/Zinc use were recommended. May take Tylenol or Motrin as needed for pain and/or fever. Patient was instructed to take antibiotic as directed, complete entire course to avoid antibiotic resistance, and take OTC probiotic with antibiotic to prevent GI upset. Patient verbalized understanding of treatment plan and denies any questions.                           Relevant Medications    methylPREDNISolone acetate injection 40 mg (Completed)    cefTRIAXone injection 1 g (Completed)    azithromycin (Z-VASYL) 250 MG tablet    pyrilamine-chlophedianoL (NINJACOF) 12.5-12.5 mg/5 mL Liqd    Other Relevant Orders    POCT SARS-COV2 (COVID) with Flu Antigen (Completed)       Cardiac/Vascular    Hypertension    Current Assessment & Plan     Blood pressure well controlled. Continue current medications. Follow up in 3 months or as needed.            Relevant Medications    lisinopriL (PRINIVIL,ZESTRIL) 2.5 MG tablet    lisinopriL (PRINIVIL,ZESTRIL) 5 MG tablet       GI    Gastroesophageal reflux disease    Current Assessment & Plan     Well controlled on current med, continue as ordered. Follow up in 3 months or as needed.         Relevant Medications    pantoprazole (PROTONIX) 40 MG tablet       Health Maintenance Topics with due status: Not Due       Topic Last Completion Date    Colorectal  Cancer Screening 12/14/2015    Hemoglobin A1c (Diabetic Prevention Screening) 12/22/2022    Lipid Panel 03/08/2023       Future Appointments   Date Time Provider Department Center   5/4/2023  2:40 PM [facility] MAMMO1 [facility]   9/20/2023  [facility]   9/20/2023 10:00 AM [facility]        Health Maintenance Due   Topic Date Due    Cervical Cancer Screening  Never done    Mammogram  03/24/2023        No follow-ups on file.     Signature:  Zahida Blanchard NP  [facility]  [address] [place] [address], [place]  [address]    Date of encounter: 3/22/23     declines

## 2025-07-01 NOTE — H&P ADULT - NSHPPHYSICALEXAM_GEN_ALL_CORE
GENERAL: NAD, lying in bed comfortably  HEAD:  Atraumatic, normocephalic  EYES: EOMI, PERRL  NECK: Supple, trachea midline, no JVD  HEART: Regular rate and rhythm  LUNGS: Unlabored respirations.  Clear to auscultation bilaterally, no crackles, wheezing, or rhonchi  ABDOMEN: Soft, nontender, nondistended, +BS  EXTREMITIES: 2+ peripheral pulses bilaterally. No clubbing, cyanosis, or edema  NERVOUS SYSTEM:  A&Ox3, moving all extremities, no focal deficits GENERAL: NAD, lying in bed comfortably  HEAD:  Atraumatic, normocephalic  EYES: EOMI, PERRL  NECK: Supple, trachea midline, no JVD  HEART: Regular rate and rhythm  LUNGS: Unlabored respirations.  Clear to auscultation bilaterally, no crackles, wheezing, or rhonchi  ABDOMEN: Soft, nontender, nondistended, +BS; pt reports urinating when palpating on suprapubic region  EXTREMITIES: 2+ peripheral pulses bilaterally. No clubbing, cyanosis, or edema  NERVOUS SYSTEM:  A&Ox3, moving all extremities, no focal deficits, CN2-12 intact, +5/5 strength upper extremities bilaterally, +5/5 strength lower extremities bilaterally; sensation intact to light touch upper and lower extremities b/l

## 2025-07-01 NOTE — H&P ADULT - PROBLEM SELECTOR PLAN 3
Hx schizophrenia, takes Trilafon (perphenazine) 32mg (16mg x 2tabs) QD, Trazodone 100 Mg QHS, Ingrezza (valbenazine) 40mg QD, Restoril (temazapam) 15mg QHS  - cont. home meds Hx schizophrenia, takes Trilafon (perphenazine) 32mg (16mg x 2tabs) QD, Trazodone 100 Mg QHS, Ingrezza (valbenazine) 40mg QD, Restoril (temazapam) 15mg QHS    - cont. home meds Hx schizophrenia, takes Trilafon (perphenazine) 32mg (16mg x 2tabs) QD, & 8mg QHS, Trazodone 100 Mg QHS, Ingrezza (valbenazine) 40mg QD, Restoril (temazapam) 15mg QHS    - cont. home meds

## 2025-07-01 NOTE — PHYSICAL THERAPY INITIAL EVALUATION ADULT - IMPAIRMENTS CONTRIBUTING TO GAIT DEVIATIONS, PT EVAL
decreased aerobic capacity/endurance/impaired balance/impaired coordination/impaired postural control/decreased strength

## 2025-07-01 NOTE — CONSULT NOTE ADULT - SUBJECTIVE AND OBJECTIVE BOX
Patient is a 79y old  Female who presents with a chief complaint of Weakness, dysuria (2025 07:15)      HPI:  Pt is a 73 yo F PMH schizophrenia, HLD, ***, admitted for weakness, unsteady gait and dysuria. She came to the ED from [facility]       *Had neurologic workup in  including MRI, EMG    In the ED:  Initial vital signs: T: 98.1 F, HR: 73, BP: 100s-170s/60s-80s, R: 16, SpO2: 98% on RA  Labs: unremarkable including CBC, BMP, UA, Utox  Imaging:  - CXR: --  - CT head reveals chronic small vessel disease  EKG:  ***  Medications: Ativan .5mg, 1000ml NS bolus  Consults: none  (2025 07:15)    PAST MEDICAL & SURGICAL HISTORY:  Schizophrenia      Hyperlipidemia        MEDICATIONS  (STANDING):    MEDICATIONS  (PRN):  acetaminophen     Tablet .. 650 milliGRAM(s) Oral every 6 hours PRN Temp greater or equal to 38C (100.4F), Mild Pain (1 - 3)    FAMILY HISTORY:      CBC Full  -  ( 2025 12:50 )  WBC Count : 6.48 K/uL  RBC Count : 4.32 M/uL  Hemoglobin : 13.2 g/dL  Hematocrit : 39.5 %  Platelet Count - Automated : 165 K/uL  Mean Cell Volume : 91.4 fl  Mean Cell Hemoglobin : 30.6 pg  Mean Cell Hemoglobin Concentration : 33.4 g/dL  Auto Neutrophil # : 5.33 K/uL  Auto Lymphocyte # : 0.55 K/uL  Auto Monocyte # : 0.54 K/uL  Auto Eosinophil # : 0.01 K/uL  Auto Basophil # : 0.03 K/uL  Auto Neutrophil % : 82.2 %  Auto Lymphocyte % : 8.5 %  Auto Monocyte % : 8.3 %  Auto Eosinophil % : 0.2 %  Auto Basophil % : 0.5 %      06-30    139  |  105  |  9   ----------------------------<  126[H]  3.9   |  27  |  0.76    Ca    9.6      2025 12:50  Mg     2.0     06-30    TPro  7.3  /  Alb  4.3  /  TBili  0.9  /  DBili  x   /  AST  29  /  ALT  40  /  AlkPhos  127[H]  06-      Urinalysis Basic - ( 2025 12:50 )    Color: Yellow / Appearance: Clear / S.009 / pH: x  Gluc: 126 mg/dL / Ketone: x  / Bili: Negative / Urobili: 0.2 mg/dL   Blood: x / Protein: Trace mg/dL / Nitrite: Negative   Leuk Esterase: Negative / RBC: 1 /HPF / WBC 1 /HPF   Sq Epi: x / Non Sq Epi: x / Bacteria: Few /HPF        Radiology :       ACC: [id number] EXAM:  CT BRAIN   ORDERED BY: FLOWER JETT     PROCEDURE DATE:  2025          INTERPRETATION:  CLINICAL INFORMATION: weakness, unsteady gait    COMPARISON: None.    CONTRAST:  IV Contrast: None      TECHNIQUE:  Serial axial images were obtained from the skull base to the   vertex using multi-slice helical technique. Sagittal and coronal   reformats were obtained.    FINDINGS:    VENTRICLES AND SULCI: Age appropriate involutional changes.  INTRA-AXIAL: No mass effect, acute hemorrhage, or midline shift.  There   are periventricular and subcortical white matter hypodensities,   consistent with microvascular type changes.  EXTRA-AXIAL: No mass or fluid collection. Basal cisterns are normal in   appearance.    VISUALIZED SINUSES:  Clear.  TYMPANOMASTOID CAVITIES:  Clear.  VISUALIZED ORBITS: Normal.  CALVARIUM: Intact.    MISCELLANEOUS: None.      IMPRESSION:  1.  No acute intracranial hemorrhage, mass effect, or midline shift.  2.  Chronic small vessel disease. Ifthere is continued concern for acute   neurologic compromise, recommend MRI of the brain for further evaluation.              Review of Systems : per HPI         Vital Signs Last 24 Hrs  T(C): 36.7 (2025 04:54), Max: 37.2 (2025 17:59)  T(F): 98.1 (2025 04:54), Max: 99 (2025 17:59)  HR: 77 (2025 04:54) (56 - 86)  BP: 141/81 (2025 04:54) (103/63 - 176/88)  BP(mean): --  RR: 18 (2025 04:54) (16 - 18)  SpO2: 96% (2025 04:54) (94% - 98%)    Parameters below as of 2025 04:54  Patient On (Oxygen Delivery Method): room air            Physical Exam:   79 y o woman lying comfortably in semi Medeiros's position , awake , alert , no acute complaints     Head: normocephalic , atraumatic    Eyes: PERRLA , EOMI , no nystagmus , sclera anicteric    ENT / FACE: neg nasal discharge , uvula midline , no oropharyngeal erythema / exudate    Neck: supple , negative JVD , negative carotid bruits , no thyromegaly    Chest: CTA bilaterally     Cardiovascular: regular rate and rhythm , neg murmurs / rubs / gallops    Abdomen: soft , non distended , no tenderness to palpation in all 4 quadrants ,  normal bowel sounds     Extremities: WWP , neg cyanosis /clubbing / edema     Neurologic Exam:     Alert and oriented to person , place , date/year , speech fluent w/o dysarthria     Cranial Nerves:           II:                         pupils equal , round and reactive to light , visual fields intact         III/ IV/VI:             extraocular movements intact , neg nystagmus , neg ptosis        V:                        facial sensation intact , V1-3 normal        VII:                      face symmetric , no droop , normal eye closure and smile        VIII:                     hearing intact to finger rub bilaterally        IX and X:             no hoarseness , gag intact , palate/ uvula rise symmetrically        XI:                       SCM / trapezius strength intact bilateral        XII:                      no tongue deviation    Motor Exam:        > 3+/5 x 4 extremities , without drift     Sensation:         intact to light touch x 4 extremities                            no neglect or extinction on double simultaneous testing    DTR:           biceps/brachioradialis: equal                            patella/ankle: equal          neg Babinski      Coordination:            Finger to Nose:  neg dysmetria bilaterally        Gait:  not tested         PM&R Impression: admitted from [place] with concerns of weakness, unsteady gait, and dysuria    - no acute pathology on CT brain imaging     - deconditioned    - no focal weakness         Recommendations / Plan:       1) Physical / Occupational therapy focusing on therapeutic exercises , equipment evaluation , bed mobility/transfer out of bed evaluation , progressive ambulation with assistive devices prn .    2) Current disposition plan recommendation:   return to NH

## 2025-07-01 NOTE — H&P ADULT - PROBLEM SELECTOR PLAN 6
F: tolerating PO, no IVF  E: replete K<4, Mg<2  N: regular    VTE Prophylaxis: Lovenox 40mg q24h ***  GI: not needed  C: Full Code  D: RMF

## 2025-07-01 NOTE — H&P ADULT - HISTORY OF PRESENT ILLNESS
Pt is a 73 yo F PMH schizophrenia, HLD, ***, admitted for weakness, unsteady gait and dysuria. She came to the ED from [facility], and reports ***       *Had neurologic workup in 2023 including MRI, EMG    In the ED:  Initial vital signs: T: 98.1 F, HR: 73, BP: 100s-170s/60s-80s, R: 16, SpO2: 98% on RA  Labs: unremarkable including CBC, BMP, UA, Utox  Imaging:  - CXR: --  - CT head reveals chronic small vessel disease  EKG:  ***  Medications: Ativan .5mg, 1000ml NS bolus  Consults: none  Pt is a 75 yo F PMH schizophrenia, HLD, admitted for weakness, unsteady gait and urinary incontinence/frequency. She came to the ED from [facility], and reports a 2 month hx of feeling unsteady in her legs, unable to stand or walk, with tremors. She previously was able to walk without much difficulty, reports that she was not using a walker or cane. No inciting events or traumas. She denies headache, dizziness, changes in vision or hearing. She also endorses a 2 month hx of urinary incontinence, and was started on Oxybutynin 2 weeks ago, which she no longer takes stating that it worsens her symptoms. She endorses not being able to make it to the bathroom in time, sometimes urinating 16x/ hour. She reports normal appetite, eating three meals per day, and does not feel excessively thirsty. Otherwise she denies F/C/ N/V diarrhea, constipation, bowel incontinence, back pain, numbness or tingling of lower extremities, dysuria, hematuria, hx of UTI's.       In the ED:  Initial vital signs: T: 98.1 F, HR: 73, BP: 100s-170s/60s-80s, R: 16, SpO2: 98% on RA  Labs: unremarkable including CBC, BMP, UA, Utox  Imaging:  - CXR: --  - CT head reveals chronic small vessel disease  EKG:  NSR at 65 BPM, No ST changes  Medications: Ativan .5mg, 1000ml NS bolus  Consults: none  Pt is a 73 yo F PMH schizophrenia, HLD, admitted for weakness, unsteady gait and urinary incontinence/ frequency. She came to the ED from [facility], and reports a 2 month hx of feeling unsteady in her legs, unable to stand or walk, with tremors. She previously was able to walk without much difficulty, reports that she was not using a walker or cane. No inciting events or traumas. She denies headache, dizziness, changes in vision or hearing. She also endorses a 2 month hx of urinary incontinence, and was started on Oxybutynin 2 weeks ago, which she no longer takes stating that it worsens her symptoms. She endorses not being able to make it to the bathroom in time, sometimes urinating 16x/ hour. She reports normal appetite, eating three meals per day, and does not feel excessively thirsty. Otherwise she denies F/C/ N/V diarrhea, constipation, bowel incontinence, back pain, numbness or tingling of lower extremities, dysuria, hematuria, hx of UTI's.       In the ED:  Initial vital signs: T: 98.1 F, HR: 73, BP: 100s-170s/60s-80s, R: 16, SpO2: 98% on RA  Labs: unremarkable including CBC, BMP, UA, Utox  Imaging:  - CT head reveals chronic small vessel disease  EKG:  NSR at 65 BPM, No ST changes  Medications: Ativan .5mg, 1000ml NS bolus  Consults: none

## 2025-07-01 NOTE — H&P ADULT - ATTENDING COMMENTS
79F with PMH of schizophrenia, HLD, and urinary incontinence (recently started on oxybutynin) presenting from [facility] with 2 months of weakness, tremors and urinary incontinence.     NAD, sitting up in bed  ncat, mmm  rrr, normal s1s2  ctab, no wheeze  soft, ntnd, normoactive bowel sounds  wwp, no asymmetry, edema or tenderness  speech fluent, no facial asymmetry, follows commands, moves all extremities, no focal deficits noted    PT and OT saw patient, recommended for MARIO.    UA negative for infection.    Had been recommended to see urology outpatient, but has not seen yet.   Holding oxybutynin. VSS and no focal neurological deficits.  Rest as above.     75 minutes spent on this encounter, including face to face with patient, review of chart, care coordination and documentation.

## 2025-07-01 NOTE — H&P ADULT - ASSESSMENT
Azra Mckenna is a 80 yo F PMH schizophrenia, HLD, sent in from [place] with concerns of weakness, unsteady gait, and dysuria for the past *****  Azra Mckenna is a 80 yo F PMH schizophrenia, HLD, sent in from [place] with concerns of leg weakness, unsteady gait, and urinary incontinence, and tremors for the past  two months. She denies other neurologic sx including numbness, weakness, back pain, bowel incontinence, changes in vision, dizziness, headache.    Azra Mckenna is a 78 yo F PMH schizophrenia, HLD, sent in from [place] with concerns of leg weakness, unsteady gait, and urinary incontinence, and tremors for the past  two months. She denies other neurologic sx including numbness, weakness, back pain, bowel incontinence, changes in vision, dizziness, headache.  Admitted for further management.

## 2025-07-01 NOTE — OCCUPATIONAL THERAPY INITIAL EVALUATION ADULT - MODIFIED CLINICAL TEST OF SENSORY INTEGRATION IN BALANCE TEST
Pt able to take ~4 steps forward/back and 3 side steps towards (L) with min x2 person assist using RW as pt with gradual increase in BUE tremors as OOB activity progressed, narrow WAQAS and decreased step length. Assist to maneuver/stabilize RW.

## 2025-07-01 NOTE — PHYSICAL THERAPY INITIAL EVALUATION ADULT - GAIT DEVIATIONS NOTED, PT EVAL
increased bilateral UE and facial tremors with mobility/decreased luiz/increased time in double stance/decreased step length/decreased stride length

## 2025-07-01 NOTE — H&P ADULT - NSHPREVIEWOFSYSTEMS_GEN_ALL_CORE
REVIEW OF SYSTEMS:    CONSTITUTIONAL:  No significant weight changes, fatigue, fevers, or chills ***  EYES/ENT:  No visual changes;  No rhinorrhea, sore throat   NECK:  No pain or stiffness  RESPIRATORY:  No cough, wheezing, hemoptysis; No shortness of breath  CARDIOVASCULAR:  No chest pain or palpitations  GASTROINTESTINAL:  No abdominal or epigastric pain. No nausea, vomiting, or hematemesis; No diarrhea or constipation. No melena or hematochezia.  GENITOURINARY:  No dysuria or hematuria  NEUROLOGICAL:  No numbness   SKIN:  No itching, rashes REVIEW OF SYSTEMS:    CONSTITUTIONAL:  No significant weight changes, fatigue, fevers, or chills  EYES/ENT:  No visual changes;  No rhinorrhea, sore throat   NECK:  No pain or stiffness  RESPIRATORY:  No cough, wheezing, hemoptysis; No shortness of breath  CARDIOVASCULAR:  No chest pain or palpitations  GASTROINTESTINAL:  No abdominal or epigastric pain. No nausea, vomiting, or hematemesis; No diarrhea or constipation. No melena or hematochezia.  GENITOURINARY:  No dysuria or hematuria  NEUROLOGICAL:  No numbness   SKIN:  No itching, rashes

## 2025-07-01 NOTE — CONSULT NOTE ADULT - ASSESSMENT
I M    78 yo F PMH schizophrenia, HLD, sent in from [place] with concerns of weakness, unsteady gait, and dysuria    Problem/Plan - 1:  ·  Problem: Weakness.   ·  Plan: Pt presents with *** hx of weakness,    Problem/Plan - 2:  ·  Problem: Dysuria.   ·  Plan: Pt has hx *** Utox (-), UA (-); takes oxybutynin.    Problem/Plan - 3:  ·  Problem: Unsteady gait.     Problem/Plan - 4:  ·  Problem: Schizophrenia.   ·  Plan: Hx schizophrenia, takes Trilafon (perphenazine), Trazodone, Ingrezza (valbenazine)  - cont. home meds   - psych consult???    Problem/Plan - 5:  ·  Problem: Hyperlipidemia.   ·  Plan: Hx hyperlipidemia, takes Lipitor  - cont. home meds.    Problem/Plan - 6:  ·  Problem: Prophylactic measure.   ·  Plan: F: tolerating PO, no IVF  E: replete K<4, Mg<2  N: regular    VTE Prophylaxis: Lovenox 40mg q24h ***  GI: not needed  C: Full Code  D: RMF.

## 2025-07-01 NOTE — PHYSICAL THERAPY INITIAL EVALUATION ADULT - GENERAL OBSERVATIONS, REHAB EVAL
Pt. received semi supine, +heplock, +primafit, +bed alarm, NAD, agreeable to PT. Cleared for mobility by RUBENS Cortes.

## 2025-07-01 NOTE — H&P ADULT - PROBLEM SELECTOR PLAN 1
Pt presents with *** hx of weakness, Pt presents with 2 month hx of weakness of her b/l lower extremities, inability to stand or walk. Previously did not have difficulty with ambulation. She denies other neurologic symptoms such as numbness, tingling, dizziness, bowel incontinence. Previously was able to ambulate normally. CT head reveals chronic small vessel disease, (-) for evidence of NPH; Limited neurologic exam WNL  -PT consult Pt presents with 2 month hx of weakness of her b/l lower extremities, inability to stand or walk. Previously did not have difficulty with ambulation. She denies other neurologic symptoms such as numbness, tingling, dizziness, bowel incontinence. Previously was able to ambulate normally. CT head reveals chronic small vessel disease, (-) for evidence of NPH; Limited neurologic exam WNL.  Outpatient note from 2023 states patient had     -PT consult Pt presents with 2 month hx of weakness of her b/l lower extremities, inability to stand or walk. Previously did not have difficulty with ambulation. She denies other neurologic symptoms such as numbness, tingling, dizziness, bowel incontinence. Previously was able to ambulate normally. CT head reveals chronic small vessel disease, (-) for evidence of NPH; Limited neurologic exam WNL.  Outpatient note from 2023 states patient had MRI and EMG WNL    -PT consult

## 2025-07-01 NOTE — H&P ADULT - NSHPLABSRESULTS_GEN_ALL_CORE
.  LABS:                         13.2   6.48  )-----------( 165      ( 2025 12:50 )             39.5         139  |  105  |  9   ----------------------------<  126[H]  3.9   |  27  |  0.76    Ca    9.6      2025 12:50  Mg     2.0         TPro  7.3  /  Alb  4.3  /  TBili  0.9  /  DBili  x   /  AST  29  /  ALT  40  /  AlkPhos  127[H]        Urinalysis Basic - ( 2025 12:50 )    Color: Yellow / Appearance: Clear / S.009 / pH: x  Gluc: 126 mg/dL / Ketone: x  / Bili: Negative / Urobili: 0.2 mg/dL   Blood: x / Protein: Trace mg/dL / Nitrite: Negative   Leuk Esterase: Negative / RBC: 1 /HPF / WBC 1 /HPF   Sq Epi: x / Non Sq Epi: x / Bacteria: Few /HPF            RADIOLOGY, EKG & ADDITIONAL TESTS: Reviewed.

## 2025-07-01 NOTE — H&P ADULT - PROBLEM SELECTOR PLAN 5
Hx hyperlipidemia, takes Lipitor  - cont. home meds F: tolerating PO, no IVF  E: replete K<4, Mg<2  N: regular    VTE Prophylaxis: Not warranted, IMPROVE score 1  GI: not needed  C: Full Code  D: RMF F: tolerating PO, no IVF  E: replete K<4, Mg<2  N: regular  VTE Prophylaxis: Lovenox   GI: not needed  C: Full Code  D: RMF

## 2025-07-01 NOTE — OCCUPATIONAL THERAPY INITIAL EVALUATION ADULT - ADDITIONAL COMMENTS
Pt reports that she has a room in assisted housing. Pt states that she has 2 home health aides 7 days a week for ~2hours and 15 minutes that assists with bathing and sometimes dressing and IADLs. Pt reports shes able to do her own toileting but has been experiencing urinary incontinence recently so has been having trouble making it to the bathroom. Pt owns a RW but states that she ambulates without AD at baseline. Pt reports having tremors at baseline that she takes medication for but states tremors and balance have been becoming increasingly worse.

## 2025-07-01 NOTE — PHYSICAL THERAPY INITIAL EVALUATION ADULT - PERTINENT HX OF CURRENT PROBLEM, REHAB EVAL
Pt is a 75 yo F PMH schizophrenia, HLD, admitted for weakness, unsteady gait and urinary incontinence/ frequency. She came to the ED from [facility], and reports a 2 month hx of feeling unsteady in her legs, unable to stand or walk, with tremors. She previously was able to walk without much difficulty, reports that she was not using a walker or cane. No inciting events or traumas. She denies headache, dizziness, changes in vision or hearing. She also endorses a 2 month hx of urinary incontinence, and was started on Oxybutynin 2 weeks ago, which she no longer takes stating that it worsens her symptoms. She endorses not being able to make it to the bathroom in time, sometimes urinating 16x/ hour. She reports normal appetite, eating three meals per day, and does not feel excessively thirsty. Otherwise she denies F/C/ N/V diarrhea, constipation, bowel incontinence, back pain, numbness or tingling of lower extremities, dysuria, hematuria, hx of UTI's.

## 2025-07-01 NOTE — H&P ADULT - PROBLEM SELECTOR PLAN 2
Pt has hx *** Utox (-), UA (-); takes oxybutynin Pt has 2 month hx urinary incontinence and urinary frequency without dysuria, hematuria; Utox (-), UA (-); Was taking oxybutynin 5mg outpatient without relief of symptoms  - Hold oxybutinin  - start pyridium 100mg qd x3 days  - F/u A1c Pt has 2 month hx urinary incontinence and urinary frequency without dysuria, hematuria; Utox (-), UA (-); Was taking oxybutynin 5mg outpatient without relief of symptoms. Possibly urge/functional incontinence     - Hold oxybutinin  - trial of pyridium 100mg qd x3 days  - F/u A1c  - consider urology f/u outpatient

## 2025-07-01 NOTE — PHYSICAL THERAPY INITIAL EVALUATION ADULT - MANUAL MUSCLE TESTING RESULTS, REHAB EVAL
4+/5 bilateral L hip flexion, 5/5 remaining bilateral LEs, >/=3+/5 bilateral UEs based on functional mobility testing

## 2025-07-01 NOTE — H&P ADULT - PROBLEM SELECTOR PLAN 4
Hx schizophrenia, takes Trilafon (perphenazine), Trazodone, Ingrezza (valbenazine)  - cont. home meds   - psych consult??? Hx hyperlipidemia, takes Lipitor 20mg QD   - cont. home meds

## 2025-07-02 ENCOUNTER — TRANSCRIPTION ENCOUNTER (OUTPATIENT)
Age: 80
End: 2025-07-02

## 2025-07-02 LAB
ANION GAP SERPL CALC-SCNC: 12 MMOL/L — SIGNIFICANT CHANGE UP (ref 5–17)
BASOPHILS # BLD AUTO: 0.04 K/UL — SIGNIFICANT CHANGE UP (ref 0–0.2)
BASOPHILS NFR BLD AUTO: 0.7 % — SIGNIFICANT CHANGE UP (ref 0–2)
BUN SERPL-MCNC: 13 MG/DL — SIGNIFICANT CHANGE UP (ref 7–23)
CALCIUM SERPL-MCNC: 9.2 MG/DL — SIGNIFICANT CHANGE UP (ref 8.4–10.5)
CHLORIDE SERPL-SCNC: 105 MMOL/L — SIGNIFICANT CHANGE UP (ref 96–108)
CO2 SERPL-SCNC: 20 MMOL/L — LOW (ref 22–31)
CREAT SERPL-MCNC: 0.75 MG/DL — SIGNIFICANT CHANGE UP (ref 0.5–1.3)
EGFR: 82 ML/MIN/1.73M2 — SIGNIFICANT CHANGE UP
EGFR: 82 ML/MIN/1.73M2 — SIGNIFICANT CHANGE UP
EOSINOPHIL # BLD AUTO: 0.05 K/UL — SIGNIFICANT CHANGE UP (ref 0–0.5)
EOSINOPHIL NFR BLD AUTO: 0.8 % — SIGNIFICANT CHANGE UP (ref 0–6)
GLUCOSE SERPL-MCNC: 114 MG/DL — HIGH (ref 70–99)
HCT VFR BLD CALC: 42.3 % — SIGNIFICANT CHANGE UP (ref 34.5–45)
HGB BLD-MCNC: 14 G/DL — SIGNIFICANT CHANGE UP (ref 11.5–15.5)
IMM GRANULOCYTES # BLD AUTO: 0.01 K/UL — SIGNIFICANT CHANGE UP (ref 0–0.07)
IMM GRANULOCYTES NFR BLD AUTO: 0.2 % — SIGNIFICANT CHANGE UP (ref 0–0.9)
LYMPHOCYTES # BLD AUTO: 1.13 K/UL — SIGNIFICANT CHANGE UP (ref 1–3.3)
LYMPHOCYTES NFR BLD AUTO: 18.6 % — SIGNIFICANT CHANGE UP (ref 13–44)
MAGNESIUM SERPL-MCNC: 2 MG/DL — SIGNIFICANT CHANGE UP (ref 1.6–2.6)
MCHC RBC-ENTMCNC: 30.6 PG — SIGNIFICANT CHANGE UP (ref 27–34)
MCHC RBC-ENTMCNC: 33.1 G/DL — SIGNIFICANT CHANGE UP (ref 32–36)
MCV RBC AUTO: 92.4 FL — SIGNIFICANT CHANGE UP (ref 80–100)
MONOCYTES # BLD AUTO: 0.72 K/UL — SIGNIFICANT CHANGE UP (ref 0–0.9)
MONOCYTES NFR BLD AUTO: 11.9 % — SIGNIFICANT CHANGE UP (ref 2–14)
NEUTROPHILS # BLD AUTO: 4.11 K/UL — SIGNIFICANT CHANGE UP (ref 1.8–7.4)
NEUTROPHILS NFR BLD AUTO: 67.8 % — SIGNIFICANT CHANGE UP (ref 43–77)
NRBC # BLD AUTO: 0 K/UL — SIGNIFICANT CHANGE UP (ref 0–0)
NRBC # FLD: 0 K/UL — SIGNIFICANT CHANGE UP (ref 0–0)
NRBC BLD AUTO-RTO: 0 /100 WBCS — SIGNIFICANT CHANGE UP (ref 0–0)
PHOSPHATE SERPL-MCNC: 3.4 MG/DL — SIGNIFICANT CHANGE UP (ref 2.5–4.5)
PLATELET # BLD AUTO: 162 K/UL — SIGNIFICANT CHANGE UP (ref 150–400)
PMV BLD: 11.4 FL — SIGNIFICANT CHANGE UP (ref 7–13)
POTASSIUM SERPL-MCNC: 3.9 MMOL/L — SIGNIFICANT CHANGE UP (ref 3.5–5.3)
POTASSIUM SERPL-SCNC: 3.9 MMOL/L — SIGNIFICANT CHANGE UP (ref 3.5–5.3)
RAPID RVP RESULT: SIGNIFICANT CHANGE UP
RBC # BLD: 4.58 M/UL — SIGNIFICANT CHANGE UP (ref 3.8–5.2)
RBC # FLD: 12.2 % — SIGNIFICANT CHANGE UP (ref 10.3–14.5)
SARS-COV-2 RNA SPEC QL NAA+PROBE: SIGNIFICANT CHANGE UP
SODIUM SERPL-SCNC: 137 MMOL/L — SIGNIFICANT CHANGE UP (ref 135–145)
WBC # BLD: 6.06 K/UL — SIGNIFICANT CHANGE UP (ref 3.8–10.5)
WBC # FLD AUTO: 6.06 K/UL — SIGNIFICANT CHANGE UP (ref 3.8–10.5)

## 2025-07-02 PROCEDURE — 99233 SBSQ HOSP IP/OBS HIGH 50: CPT | Mod: GC

## 2025-07-02 RX ADMIN — ENOXAPARIN SODIUM 40 MILLIGRAM(S): 100 INJECTION SUBCUTANEOUS at 22:07

## 2025-07-02 RX ADMIN — VALBENAZINE 40 MILLIGRAM(S): 80 CAPSULE ORAL at 11:48

## 2025-07-02 RX ADMIN — PERPHENAZINE 8 MILLIGRAM(S): 2 TABLET, FILM COATED ORAL at 22:07

## 2025-07-02 RX ADMIN — Medication 100 MILLIGRAM(S): at 14:23

## 2025-07-02 RX ADMIN — Medication 100 MILLIGRAM(S): at 22:07

## 2025-07-02 RX ADMIN — Medication 100 MILLIGRAM(S): at 06:35

## 2025-07-02 RX ADMIN — PERPHENAZINE 32 MILLIGRAM(S): 2 TABLET, FILM COATED ORAL at 09:16

## 2025-07-02 RX ADMIN — ATORVASTATIN CALCIUM 20 MILLIGRAM(S): 80 TABLET, FILM COATED ORAL at 22:07

## 2025-07-02 NOTE — PROGRESS NOTE ADULT - PROBLEM SELECTOR PLAN 1
Pt presents with 2 month hx of weakness of her b/l lower extremities, inability to stand or walk. Previously did not have difficulty with ambulation. She denies other neurologic symptoms such as numbness, tingling, dizziness, bowel incontinence. Previously was able to ambulate normally. CT head reveals chronic small vessel disease, (-) for evidence of NPH; Limited neurologic exam WNL.  Outpatient note from 2023 states patient had MRI and EMG WNL    -PT consult Pt presents with 2 month hx of weakness of her b/l lower extremities, inability to stand or walk. Previously did not have difficulty with ambulation. She denies other neurologic symptoms such as numbness, tingling, dizziness, bowel incontinence. Previously was able to ambulate normally. CT head reveals chronic small vessel disease, (-) for evidence of NPH; Limited neurologic exam WNL.  Outpatient note from 2023 states patient had MRI and EMG WNL    -PT recommending MARIO

## 2025-07-02 NOTE — DISCHARGE NOTE PROVIDER - NSFOLLOWUPCLINICS_GEN_ALL_ED_FT
[facility] Urology [facility]  Urology  [address] [place] [address]  Phone: [phone]  Fax:   Follow Up Time: 1 week

## 2025-07-02 NOTE — DISCHARGE NOTE PROVIDER - CARE PROVIDERS DIRECT ADDRESSES
[email or web address] ,DirectAddress_Unknown ,DirectAddress_Unknown,DirectAddress_Unknown

## 2025-07-02 NOTE — PROGRESS NOTE ADULT - PROBLEM SELECTOR PLAN 3
Hx schizophrenia, takes Trilafon (perphenazine) 32mg (16mg x 2tabs) QD, & 8mg QHS, Trazodone 100 Mg QHS, Ingrezza (valbenazine) 40mg QD, Restoril (temazapam) 15mg QHS    - cont. home meds Patient seen and examined this morning with  at bedside    56 year old female with hx of HTN, HLD, anemia, diastolic HF, ESRD (HD TTS) x 4 yrs, presenting with chills, fevers, productive cough, sore throat and bilateral flank pain since Thursday. Patient underwent dialysis catheter exchange (right chest wall permacath) on previous Monday, due to a malfunctioning catheter. She states the exchange was complicated with bleeding. She notes receiving dialysis on Tuesday, Thursday and Saturday, without disruptions. Patient also reports her daughter having flu-like symptoms last week. In the ED, vitals significant for Tmax of 101F, tachycardic and hypertensive. Permacath site clean, without surrounding cellulitis or discharge. On admission, Labs notable for no leukocytosis, thrombocytopenia of 62, and lactate of 0.4. CXR consistent with fluid overload, R >L. Patient admitted for sepsis 2/2 catheter infection vs viral infection but noted to have viral infection with HMPV    Course complicated by acute hypoxic respiratory failure 2/2 flash pulmonary edema in the AM on 4/28, requiring BiPAP. She has undergone HD 4/29 as well as receiving empiric doses of high dose IV Lasix with significant clinical improvement and only on 2 liter NC now saturating well    Patient is feeling much better cough minimal;  Shortness of breath resolved. No fever spikes; For AV fistula placement this afternoon    Vital Signs Last 24 Hrs  T(C): 37 (02 May 2024 16:19), Max: 37 (02 May 2024 13:29)  T(F): 98.6 (02 May 2024 16:19), Max: 98.6 (02 May 2024 13:29)  HR: 76 (02 May 2024 16:19) (72 - 87)  BP: 161/87 (02 May 2024 16:19) (136/90 - 168/88)  BP(mean): 110 (02 May 2024 15:55) (110 - 112)  RR: 18 (02 May 2024 16:19) (13 - 20)  SpO2: 96% (02 May 2024 16:19) (95% - 98% room air    P/E:   Psych: AAO x3  Neuro: No gross focal deficits; Power and sensation intact  CVS: S1S2 present, regular, fine rales at both bases  Resp: BLAE+, No active wheeze or Rhonchi  GI: Soft, BS+, Non tender, non distended  Extr: No  calf tenderness B/L Lower extremities  Skin: Warm and moist without any rashes    Labs:                                 10.9   2.54  )-----------( 82       ( 02 May 2024 08:05 )             34.2   05-02  136  |  100  |  36<H>  ----------------------------<  87  4.6   |  30  |  6.19<H>  Ca    7.6<L>      02 May 2024 08:05  Phos  3.8     05-02  Mg     2.1     05-02          Culture - Blood (04.27.24 @ 23:09) x 2 sets  Culture Results: No growth at 48 Hours    TTE W or WO Ultrasound Enhancing Agent (04.28.24 @ 12:16)   CONCLUSIONS:   1. Left ventricular cavity is moderately dilated. Left ventricular wall thickness is normal. Left ventricular systolic function is normalwith an ejection fraction of 54 % by Peralta's method of disks. There are no regional wall motion abnormalities seen.   2. There is mild (grade 1) left ventricular diastolic dysfunction.   3. Normal right ventricular cavity size, with normal wall thickness, and normal systolic function. Tricuspid annular plane systolic excursion (TAPSE) is 2.8 cm (normal >=1.7 cm).   4. Mild to moderate mitral regurgitation.   5. Normal left and right atrial size.   6. Mild tricuspid regurgitation.   7. Estimated pulmonary artery systolic pressure is 37 mmHg.   8. Left pleural effusion noted.   9. Mild aortic regurgitation.  10. Mild pulmonic regurgitation.  11. There is increased LV mass and eccentric hypertrophy.  12. Small pericardial effusion.  13. No prior echocardiogram is available for comparison.    A/P  #Sepsis 2/2 viral infection due to HMPV   Acute Hypoxic Resp. failure due to fluid overload much improved  #Clinically not Permacath infection  #Pancytopenia likely confirming Viral etiology  #Heart failure, diastolic due to fluid overload  Preop evaluation  #ESRD on dialysis TTS (currently MWF)  #HTN/ HLD    Plan:  Patient rapid clinical improvement, PC site clean, leucopenia and thrombocytopenia, blood Cx negative   Continue , supportive care for viral infection; off Isolation; remain afebrile off Antibiotics   ID consult appreciated; no clinical evidence of Bacterial infection or catheter infection   Patient saturating well on room air  Nephro follow up; d/w Dr. Calle; if remain stable can be discharged tomorrow for outpt HD on Saturday;   if any evidence of fluid overload, will do short dialysis prior to discharge   Patient is Optimized for planned Vascular procedure at intermediate risk; d/w Sx DEIDRA Ku  Continue Phoslo BID; P stable  I believe we can discontinue lasix on non HD days as patient is Euvolemic now  TTE insignificant except for mild to moderate MR  -rest of plan per resident note    Discussed with patient and spouse at bedside  Discussed with PGY1/ PGY3/ / Nephro Patient seen and examined this morning with  at bedside    56 year old female with hx of HTN, HLD, anemia, diastolic HF, ESRD (HD TTS) x 4 yrs, presenting with chills, fevers, productive cough, sore throat and bilateral flank pain since Thursday. Patient underwent dialysis catheter exchange (right chest wall permacath) on previous Monday, due to a malfunctioning catheter. She states the exchange was complicated with bleeding. She notes receiving dialysis on Tuesday, Thursday and Saturday, without disruptions. Patient also reports her daughter having flu-like symptoms last week. In the ED, vitals significant for Tmax of 101F, tachycardic and hypertensive. Permacath site clean, without surrounding cellulitis or discharge. On admission, Labs notable for no leukocytosis, thrombocytopenia of 62, and lactate of 0.4. CXR consistent with fluid overload, R >L. Patient admitted for sepsis 2/2 catheter infection vs viral infection but noted to have viral infection with HMPV    Course complicated by acute hypoxic respiratory failure 2/2 flash pulmonary edema in the AM on 4/28, requiring BiPAP. She has undergone HD 4/29 as well as receiving empiric doses of high dose IV Lasix with significant clinical improvement and weaned off O2  now saturating well RA    Patient is feeling much better cough minimal;  Shortness of breath resolved. No fever spikes; For AV fistula placement this afternoon    Vital Signs Last 24 Hrs  T(C): 37 (02 May 2024 16:19), Max: 37 (02 May 2024 13:29)  T(F): 98.6 (02 May 2024 16:19), Max: 98.6 (02 May 2024 13:29)  HR: 76 (02 May 2024 16:19) (72 - 87)  BP: 161/87 (02 May 2024 16:19) (136/90 - 168/88)  BP(mean): 110 (02 May 2024 15:55) (110 - 112)  RR: 18 (02 May 2024 16:19) (13 - 20)  SpO2: 96% (02 May 2024 16:19) (95% - 98% room air    P/E:   Psych: AAO x3  Neuro: No gross focal deficits; Power and sensation intact  CVS: S1S2 present, regular, fine rales at both bases  Resp: BLAE+, No active wheeze or Rhonchi  GI: Soft, BS+, Non tender, non distended  Extr: No  calf tenderness B/L Lower extremities  Skin: Warm and moist without any rashes    Labs:                                 10.9   2.54  )-----------( 82       ( 02 May 2024 08:05 )             34.2   05-02  136  |  100  |  36<H>  ----------------------------<  87  4.6   |  30  |  6.19<H>  Ca    7.6<L>      02 May 2024 08:05  Phos  3.8     05-02  Mg     2.1     05-02          Culture - Blood (04.27.24 @ 23:09) x 2 sets  Culture Results: No growth at 48 Hours    TTE W or WO Ultrasound Enhancing Agent (04.28.24 @ 12:16)   CONCLUSIONS:   1. Left ventricular cavity is moderately dilated. Left ventricular wall thickness is normal. Left ventricular systolic function is normalwith an ejection fraction of 54 % by Peralta's method of disks. There are no regional wall motion abnormalities seen.   2. There is mild (grade 1) left ventricular diastolic dysfunction.   3. Normal right ventricular cavity size, with normal wall thickness, and normal systolic function. Tricuspid annular plane systolic excursion (TAPSE) is 2.8 cm (normal >=1.7 cm).   4. Mild to moderate mitral regurgitation.   5. Normal left and right atrial size.   6. Mild tricuspid regurgitation.   7. Estimated pulmonary artery systolic pressure is 37 mmHg.   8. Left pleural effusion noted.   9. Mild aortic regurgitation.  10. Mild pulmonic regurgitation.  11. There is increased LV mass and eccentric hypertrophy.  12. Small pericardial effusion.  13. No prior echocardiogram is available for comparison.    A/P  #Sepsis 2/2 viral infection due to HMPV   Acute Hypoxic Resp. failure due to fluid overload resolved  #s/p AVF placement POD#0  #Clinically not Permacath infection  #Pancytopenia likely confirming Viral etiology  #Heart failure, diastolic due to fluid overload  #ESRD on dialysis TTS outpt (currently MWF)  #HTN/ HLD    Plan:  Patient rapid clinical improvement, PC site clean, leucopenia and thrombocytopenia, blood Cx negative   Continue , supportive care for viral infection; off Isolation; remain afebrile off Antibiotics   ID consult appreciated; no clinical evidence of Bacterial infection or catheter infection   Patient saturating well on room air  Nephro follow up; d/w Dr. Calle; if remain stable can be discharged tomorrow for outpt HD on Saturday;   if any evidence of fluid overload, will do short dialysis prior to discharge   Continue Phoslo BID; P stable  I believe we can discontinue lasix on non HD days as patient is Euvolemic now  TTE insignificant except for mild to moderate MR  -rest of plan per resident note    Discussed with patient and spouse at bedside; likely D/C Home tomorrow with resumption of outpt HD form saturday; d/w Marietta Solares  Discussed with PGY1/ PGY3/ / Nephro/ SW

## 2025-07-02 NOTE — DISCHARGE NOTE PROVIDER - PROVIDER TOKENS
PROVIDER:[TOKEN:[[id number]],FOLLOWUP:[1 week]] FREE:[LAST:[Kiffer],FIRST:[Fish],PHONE:[phone]],FAX:[(   )    -],ADDRESS:[Neurology  [address] [place] [address], [address]   [place] [address]]] FREE:[LAST:[Kiffer],FIRST:[Fish],PHONE:[phone]],FAX:[(   )    -],ADDRESS:[Neurology  [address], [address]   [place] [address]]],FREE:[LAST:[Nabila],FIRST:[Dontrell],PHONE:[phone]],FAX:[(   )    -],ADDRESS:[Urology  [address], [address] [place] [address]],SCHEDULEDAPPT:[07/18/2025],SCHEDULEDAPPTTIME:[02:30 PM]]

## 2025-07-02 NOTE — PROGRESS NOTE ADULT - ASSESSMENT
Azra Mckenna is a 80 yo F PMH schizophrenia, HLD, sent in from [place] with concerns of leg weakness, unsteady gait, and urinary incontinence, and tremors for the past  two months. She denies other neurologic sx including numbness, weakness, back pain, bowel incontinence, changes in vision, dizziness, headache.  Admitted for further management.  Azra Mckenna is a 78 yo F PMH schizophrenia, HLD, sent in from [place] with concerns of leg weakness, unsteady gait, and urinary incontinence, and tremors for the past  two months. She denies other neurologic sx including numbness, weakness, back pain, bowel incontinence, changes in vision, dizziness, headache.  Admitted for further management and now receiving q6h bladder scans Azra Mckenna is a 78 yo F PMH schizophrenia, HLD, sent in from [place] with concerns of leg weakness, unsteady gait, and urinary incontinence, and tremors for the past  two months. She denies other neurologic sx including numbness, weakness, back pain, bowel incontinence, changes in vision, dizziness, headache.  Admitted for further management

## 2025-07-02 NOTE — DISCHARGE NOTE PROVIDER - NSDCCPTREATMENT_GEN_ALL_CORE_FT
PRINCIPAL PROCEDURE  Procedure: MRI head  Findings and Treatment: ACC: [id number] EXAM: MR SPINE CERVICAL ORDERED BY: NOLAN RM  ACC: [id number] EXAM: MR BRAIN WAW IC ORDERED BY: NOLAN RM  PROCEDURE DATE: 07/06/2025  INTERPRETATION: CLINICAL INDICATION: Weakness. Hyperreflexia. Increased urinary frequency.  TECHNIQUE: Multi-planar multi-sequential MR imaging of the brain and cervical spine was performed before and after the intravenous administration of 6 ml of Gadavist. 1.5 mL discarded.  COMPARISON: CT head 6/30/2025.  FINDINGS:  MRI BRAIN  No acute infarction, intracranial hemorrhage or mass. No abnormal intracranial enhancement is identified. Scattered T2 FLAIR white matter hyperintensities consistent with chronic microvascular disease.  The ventricles are increased in size in proportion with the degree of sulcal atrophy. There are no extra-axial fluid collections. The skull base flow voids are present.  The visualized intraorbital contents are normal. The imaged portions of the paranasal sinuses are clear. The mastoid air cells are clear. The visualized soft tissues and osseous structures appear normal.  MRI CERVICAL SPINE  ALIGNMENT: Straightening of the spine without listhesis.  VERTEBRAE: The vertebral bodies are normal in height. There is no fracture or aggressive osseous lesion.  DISCS: Multilevel degenerative disc changes, worst at C6-C7.  CORD: There is no intrinsic spinal cord signal abnormality.  PARAVERTEBRAL SOFT TISSUES: The visualized paravertebral soft tissues appear within normal limits.  EVALUATION OF INDIVIDUAL LEVELS:  C2-3: No spinal canal stenosis. Mild left neuroforaminal stenosis.  C3-4: Moderate spinal canal stenosis. Mild left neuroforaminal stenosis.  C4-5: Moderate spinal canal stenosis. No neuroforaminal stenosis.  C5-6: Mild spinal canal stenosis. No neuroforaminal stenosis.  C6-7: No spinal canal stenosis. No neuroforaminal stenosis.  C7-T1: No spinal canal stenosis. No neuroforaminal stenosis.  IMPRESSION:  No acute intracranial pathology. Chronic white matter microvascular disease.        SECONDARY PROCEDURE  Procedure: MRI C spine  Findings and Treatment: ACC: [id number] EXAM: MR SPINE CERVICAL ORDERED BY: NOLAN RM  ACC: [id number] EXAM: MR BRAIN WAW IC ORDERED BY: NOLAN RM  PROCEDURE DATE: 07/06/2025  INTERPRETATION: CLINICAL INDICATION: Weakness. Hyperreflexia. Increased urinary frequency.  TECHNIQUE: Multi-planar multi-sequential MR imaging of the brain and cervical spine was performed before and after the intravenous administration of 6 ml of Gadavist. 1.5 mL discarded.  COMPARISON: CT head 6/30/2025.  FINDINGS:  MRI BRAIN  No acute infarction, intracranial hemorrhage or mass. No abnormal intracranial enhancement is identified. Scattered T2 FLAIR white matter hyperintensities consistent with chronic microvascular disease.  The ventricles are increased in size in proportion with the degree of sulcal atrophy. There are no extra-axial fluid collections. The skull base flow voids are present.  The visualized intraorbital contents are normal. The imaged portions of the paranasal sinuses are clear. The mastoid air cells are clear. The visualized soft tissues and osseous structures appear normal.  MRI CERVICAL SPINE  ALIGNMENT: Straightening of the spine without listhesis.  VERTEBRAE: The vertebral bodies are normal in height. There is no fracture or aggressive osseous lesion.  DISCS: Multilevel degenerative disc changes, worst at C6-C7.  CORD: There is no intrinsic spinal cord signal abnormality.  PARAVERTEBRAL SOFT TISSUES: The visualized paravertebral soft tissues appear within normal limits.  EVALUATION OF INDIVIDUAL LEVELS:  C2-3: No spinal canal stenosis. Mild left neuroforaminal stenosis.  C3-4: Moderate spinal canal stenosis. Mild left neuroforaminal stenosis.  C4-5: Moderate spinal canal stenosis. No neuroforaminal stenosis.  C5-6: Mild spinal canal stenosis. No neuroforaminal stenosis.  C6-7: No spinal canal stenosis. No neuroforaminal stenosis.  C7-T1: No spinal canal stenosis. No neuroforaminal stenosis.  IMPRESSION:  No acute intracranial pathology. Chronic white matter microvascular disease.

## 2025-07-02 NOTE — PROGRESS NOTE ADULT - SUBJECTIVE AND OBJECTIVE BOX
Physical Medicine and Rehabilitation Progress Note :       Patient is a 79y old  Female who presents with a chief complaint of Leg weakness, urinary incontinence (02 Jul 2025 11:27)      HPI:  Pt is a 73 yo F PMH schizophrenia, HLD, admitted for weakness, unsteady gait and urinary incontinence/ frequency. She came to the ED from [facility], and reports a 2 month hx of feeling unsteady in her legs, unable to stand or walk, with tremors. She previously was able to walk without much difficulty, reports that she was not using a walker or cane. No inciting events or traumas. She denies headache, dizziness, changes in vision or hearing. She also endorses a 2 month hx of urinary incontinence, and was started on Oxybutynin 2 weeks ago, which she no longer takes stating that it worsens her symptoms. She endorses not being able to make it to the bathroom in time, sometimes urinating 16x/ hour. She reports normal appetite, eating three meals per day, and does not feel excessively thirsty. Otherwise she denies F/C/ N/V diarrhea, constipation, bowel incontinence, back pain, numbness or tingling of lower extremities, dysuria, hematuria, hx of UTI's.       In the ED:  Initial vital signs: T: 98.1 F, HR: 73, BP: 100s-170s/60s-80s, R: 16, SpO2: 98% on RA  Labs: unremarkable including CBC, BMP, UA, Utox  Imaging:  - CT head reveals chronic small vessel disease  EKG:  NSR at 65 BPM, No ST changes  Medications: Ativan .5mg, 1000ml NS bolus  Consults: none  (01 Jul 2025 07:15)                            14.0   6.06  )-----------( 162      ( 02 Jul 2025 07:00 )             42.3       07-02    137  |  105  |  13  ----------------------------<  114[H]  3.9   |  20[L]  |  0.75    Ca    9.2      02 Jul 2025 07:00  Phos  3.4     07-02  Mg     2.0     07-02    TPro  7.3  /  Alb  4.3  /  TBili  0.9  /  DBili  x   /  AST  29  /  ALT  40  /  AlkPhos  127[H]  06-30    Vital Signs Last 24 Hrs  T(C): 36.7 (02 Jul 2025 06:23), Max: 36.8 (01 Jul 2025 20:53)  T(F): 98.1 (02 Jul 2025 06:23), Max: 98.3 (01 Jul 2025 20:53)  HR: 65 (02 Jul 2025 06:23) (65 - 74)  BP: 108/70 (02 Jul 2025 06:23) (102/64 - 108/70)  BP(mean): 77 (01 Jul 2025 20:53) (77 - 77)  RR: 18 (02 Jul 2025 06:23) (17 - 18)  SpO2: 97% (02 Jul 2025 06:23) (96% - 97%)    Parameters below as of 02 Jul 2025 06:23  Patient On (Oxygen Delivery Method): room air        MEDICATIONS  (STANDING):  atorvastatin 20 milliGRAM(s) Oral at bedtime  enoxaparin Injectable 40 milliGRAM(s) SubCutaneous every 24 hours  perphenazine 32 milliGRAM(s) Oral <User Schedule>  perphenazine 8 milliGRAM(s) Oral at bedtime  phenazopyridine 100 milliGRAM(s) Oral every 8 hours  traZODone 100 milliGRAM(s) Oral at bedtime  valbenazine Capsule 40 milliGRAM(s) Oral daily    MEDICATIONS  (PRN):  acetaminophen     Tablet .. 650 milliGRAM(s) Oral every 6 hours PRN Temp greater or equal to 38C (100.4F), Mild Pain (1 - 3)  temazepam 15 milliGRAM(s) Oral at bedtime PRN Insomnia      T(C): 36.7 (07-02-25 @ 06:23), Max: 36.8 (07-01-25 @ 20:53)  HR: 65 (07-02-25 @ 06:23) (65 - 74)  BP: 108/70 (07-02-25 @ 06:23) (102/64 - 108/70)  RR: 18 (07-02-25 @ 06:23) (17 - 18)  SpO2: 97% (07-02-25 @ 06:23) (96% - 97%)    Physical Exam:    79 y o woman lying comfortably in semi Medeiros's position , awake , alert , no acute complaints     Head: normocephalic , atraumatic    Eyes: PERRLA , EOMI , no nystagmus , sclera anicteric    ENT / FACE: neg nasal discharge , uvula midline , no oropharyngeal erythema / exudate    Neck: supple , negative JVD , negative carotid bruits , no thyromegaly    Chest: CTA bilaterally     Cardiovascular: regular rate and rhythm , neg murmurs / rubs / gallops    Abdomen: soft , non distended , no tenderness to palpation in all 4 quadrants ,  normal bowel sounds     Extremities: WWP , neg cyanosis /clubbing / edema     Neurologic Exam:     Alert and oriented to person , place , date/year , speech fluent w/o dysarthria     Cranial Nerves:           II:                         pupils equal , round and reactive to light , visual fields intact         III/ IV/VI:             extraocular movements intact , neg nystagmus , neg ptosis        V:                        facial sensation intact , V1-3 normal        VII:                      face symmetric , no droop , normal eye closure and smile        VIII:                     hearing intact to finger rub bilaterally        IX and X:             no hoarseness , gag intact , palate/ uvula rise symmetrically        XI:                       SCM / trapezius strength intact bilateral        XII:                      no tongue deviation    Motor Exam:        > 3+/5 x 4 extremities , without drift     Sensation:         intact to light touch x 4 extremities                            no neglect or extinction on double simultaneous testing    DTR:           biceps/brachioradialis: equal                            patella/ankle: equal          neg Babinski      Coordination:            Finger to Nose:  neg dysmetria bilaterally      Initial Functional Status Assessment :       Previous Level of Function:     · Ambulation Skills  independent  · Transfer Skills  independent  · Additional Comments  Pt reports that she has a room in assisted housing. Pt states that she has 2 home health aides 7 days a week for ~2hours and 15 minutes that assists with bathing and sometimes dressing and IADLs. Pt reports shes able to do her own toileting but has been experiencing urinary incontinence recently so has been having trouble making it to the bathroom. Pt owns a RW but states that she ambulates without AD at baseline. Pt reports having tremors at baseline that she takes medication for but states tremors and balance have been becoming increasingly worse.    Cognitive Status Examination:   · Orientation  oriented to person, place, time and situation  · Level of Consciousness  alert  · Follows Commands and Answers Questions  100% of the time; able to follow single-step instructions    Range of Motion Exam:   · Active Range of Motion Examination  bilateral upper extremity Active ROM was WFL (within functional limits); bilateral  lower extremity Active ROM was WFL (within functional limits)    Manual Muscle Testing:   · Manual Muscle Testing Results  4+/5 bilateral L hip flexion, 5/5 remaining bilateral LEs, >/=3+/5 bilateral UEs based on functional mobility testing    Bed Mobility: Rolling/Turning:     · Level of [place]  supervision    Bed Mobility: Sit to Supine:     · Level of [place]  contact guard  · Physical Assist/Nonphysical Assist  verbal cues; 1 person assist    Bed Mobility: Supine to Sit:     · Level of [place]  supervision  · Physical Assist/Nonphysical Assist  verbal cues    Bed Mobility Analysis:     · Impairments Contributing to Impaired Bed Mobility  decreased strength; impaired balance; impaired postural control; bilateral UE tremors at rest and with movement    Transfer: Sit to Stand:     · Level of [place]  contact guard  · Physical Assist/Nonphysical Assist  verbal cues; 1 person assist  · Weight-Bearing Restrictions  weight-bearing as tolerated  · Assistive Device  rolling walker    Transfer: Stand to Sit:     · Level of [place]  contact guard  · Physical Assist/Nonphysical Assist  verbal cues; 1 person assist  · Weight-Bearing Restrictions  weight-bearing as tolerated  · Assistive Device  rolling walker    Sit/Stand Transfer Safety Analysis:     · Transfer Safety Concerns Noted  bilateral UE and facial tremors in standing  · Impairments Contributing to Impaired Transfers  impaired balance; impaired postural control; decreased strength; decreased aerobic capacity/endurance    Gait Skills:     · Level of [place]  minimum assist (75% patients effort)  · Physical Assist/Nonphysical Assist  verbal cues; 2 person assist  · Weight-Bearing Restrictions  weight-bearing as tolerated  · Assistive Device  rolling walker  · Gait Distance  2 steps fwd/back-further distance limited due to increasing bilateral UE tremors with mobility    Gait Analysis:     · Gait Pattern Used  3-point gait   · Gait Deviations Noted  decreased luiz; decreased step length; decreased stride length; increased time in double stance; increased bilateral UE and facial tremors with mobility  · Impairments Contributing to Gait Deviations  impaired balance; impaired coordination; impaired postural control; decreased strength; decreased aerobic capacity/endurance    Balance Skills Assessment:     · Sitting Balance: Static  fair balance   · Sitting Balance: Dynamic  fair minus   · Sit-to-Stand Balance  fair minus   · Standing Balance: Static  fair minus   · Standing Balance: Dynamic  poor balance   · Systems Impairment Contributing to Balance Disturbance  musculoskeletal  · Identified Impairments Contributing to Balance Disturbance  impaired coordination; decreased strength    Clinical Impressions:   · Criteria for Skilled Therapeutic Interventions  impairments found; functional limitations in following categories; anticipated discharge recommendation; rehab potential  · Impairments Found (describe specific impairments)  gait, locomotion, and balance; poor safety awareness; posture; ROM; gross motor; muscle strength  · Functional Limitations in Following Categories (describe specific limitations)  self-care; home management; community/leisure    Fine Motor Coordination:   · Left Hand, Finger to Nose  mild impairment  slight dysmetria with increase in tremors LUE>RUE  · Right Hand, Finger to Nose  normal performance  · Left Hand Thumb/Finger Opposition Skills  normal performance  · Right Hand Thumb/Finger Opposition Skills  normal performance    Upper Body Dressing Training:     · Level of [place]  moderate assist (50% patients effort)  · Physical Assist/Nonphysical Assist  1 person assist; nonverbal cues (demo/gestures); verbal cues          PM&R Impression : as above    Current disposition plan recommendation :    subacute rehab placement

## 2025-07-02 NOTE — DISCHARGE NOTE PROVIDER - NSDCMRMEDTOKEN_GEN_ALL_CORE_FT
atorvastatin 20 mg oral tablet: 1 tab(s) orally once a day (at bedtime)  Multiple Vitamins with Minerals oral tablet: 1 tab(s) orally once a day  perphenazine 16 mg oral tablet: 2 tab(s) orally once a day (in the morning)  perphenazine 8 mg oral tablet: 1 tab(s) orally once a day (at bedtime)  polyethylene glycol 3350 oral powder for reconstitution: 17 gram(s) orally once a day (at bedtime)  temazepam 15 mg oral capsule: 1 cap(s) orally once a day (at bedtime) As needed Insomnia  traZODone 100 mg oral tablet: 1 tab(s) orally once a day (at bedtime)  valbenazine 40 mg oral capsule: 1 cap(s) orally once a day

## 2025-07-02 NOTE — PROGRESS NOTE ADULT - SUBJECTIVE AND OBJECTIVE BOX
**INCOMPLETE NOTE    OVERNIGHT EVENTS: none    SUBJECTIVE:  Patient seen and examined at bedside.    Vital Signs Last 12 Hrs  T(F): 98.1 (07-02-25 @ 06:23), Max: 98.3 (07-01-25 @ 20:53)  HR: 65 (07-02-25 @ 06:23) (65 - 74)  BP: 108/70 (07-02-25 @ 06:23) (102/64 - 108/70)  BP(mean): 77 (07-01-25 @ 20:53) (77 - 77)  RR: 18 (07-02-25 @ 06:23) (17 - 18)  SpO2: 97% (07-02-25 @ 06:23) (96% - 97%)  I&O's Summary      PHYSICAL EXAM:  Constitutional: NAD, comfortable in bed.  HEENT: NC/AT, PERRLA, EOMI, no conjunctival pallor or scleral icterus, MMM  Neck: Supple, no JVD  Respiratory: CTA B/L. No w/r/r.   Cardiovascular: RRR, normal S1 and S2, no m/r/g.   Gastrointestinal: +BS, soft NTND, no guarding or rebound tenderness, no palpable masses   Extremities: wwp; no cyanosis, clubbing or edema.   Vascular: Pulses equal and strong throughout.   Neurological: AAOx3, no CN deficits, strength and sensation intact throughout.   Skin: No gross skin abnormalities or rashes        LABS:                        14.0   6.06  )-----------( 162      ( 02 Jul 2025 07:00 )             42.3     07-02    137  |  105  |  13  ----------------------------<  114[H]  3.9   |  20[L]  |  0.75    Ca    9.2      02 Jul 2025 07:00  Phos  3.4     07-02  Mg     2.0     07-02    TPro  7.3  /  Alb  4.3  /  TBili  0.9  /  DBili  x   /  AST  29  /  ALT  40  /  AlkPhos  127[H]  06-30      Urinalysis Basic - ( 02 Jul 2025 07:00 )    Color: x / Appearance: x / SG: x / pH: x  Gluc: 114 mg/dL / Ketone: x  / Bili: x / Urobili: x   Blood: x / Protein: x / Nitrite: x   Leuk Esterase: x / RBC: x / WBC x   Sq Epi: x / Non Sq Epi: x / Bacteria: x          RADIOLOGY & ADDITIONAL TESTS:    MEDICATIONS  (STANDING):  atorvastatin 20 milliGRAM(s) Oral at bedtime  enoxaparin Injectable 40 milliGRAM(s) SubCutaneous every 24 hours  perphenazine 32 milliGRAM(s) Oral <User Schedule>  perphenazine 8 milliGRAM(s) Oral at bedtime  phenazopyridine 100 milliGRAM(s) Oral every 8 hours  traZODone 100 milliGRAM(s) Oral at bedtime  valbenazine Capsule 40 milliGRAM(s) Oral daily    MEDICATIONS  (PRN):  acetaminophen     Tablet .. 650 milliGRAM(s) Oral every 6 hours PRN Temp greater or equal to 38C (100.4F), Mild Pain (1 - 3)  temazepam 15 milliGRAM(s) Oral at bedtime PRN Insomnia   **INCOMPLETE NOTE    OVERNIGHT EVENTS: none    SUBJECTIVE:  Patient seen and examined at bedside. Pt reporting ongoing weakness in legs feeling as though she is going to fall, as well as ongoing urinary frequency/incontinence with no improvement. has not had any     Vital Signs Last 12 Hrs  T(F): 98.1 (07-02-25 @ 06:23), Max: 98.3 (07-01-25 @ 20:53)  HR: 65 (07-02-25 @ 06:23) (65 - 74)  BP: 108/70 (07-02-25 @ 06:23) (102/64 - 108/70)  BP(mean): 77 (07-01-25 @ 20:53) (77 - 77)  RR: 18 (07-02-25 @ 06:23) (17 - 18)  SpO2: 97% (07-02-25 @ 06:23) (96% - 97%)  I&O's Summary      PHYSICAL EXAM:  Constitutional: NAD, comfortable in bed.  HEENT: NC/AT, PERRLA, EOMI, no conjunctival pallor or scleral icterus, MMM  Neck: Supple, no JVD  Respiratory: CTA B/L. No w/r/r.   Cardiovascular: RRR, normal S1 and S2, no m/r/g.   Gastrointestinal: +BS, soft NTND, no guarding or rebound tenderness, no palpable masses   Extremities: wwp; no cyanosis, clubbing or edema.   Vascular: Pulses equal and strong throughout.   Neurological: AAOx3, no CN deficits, strength and sensation intact throughout.   Skin: No gross skin abnormalities or rashes        LABS:                        14.0   6.06  )-----------( 162      ( 02 Jul 2025 07:00 )             42.3     07-02    137  |  105  |  13  ----------------------------<  114[H]  3.9   |  20[L]  |  0.75    Ca    9.2      02 Jul 2025 07:00  Phos  3.4     07-02  Mg     2.0     07-02    TPro  7.3  /  Alb  4.3  /  TBili  0.9  /  DBili  x   /  AST  29  /  ALT  40  /  AlkPhos  127[H]  06-30      Urinalysis Basic - ( 02 Jul 2025 07:00 )    Color: x / Appearance: x / SG: x / pH: x  Gluc: 114 mg/dL / Ketone: x  / Bili: x / Urobili: x   Blood: x / Protein: x / Nitrite: x   Leuk Esterase: x / RBC: x / WBC x   Sq Epi: x / Non Sq Epi: x / Bacteria: x          RADIOLOGY & ADDITIONAL TESTS:    MEDICATIONS  (STANDING):  atorvastatin 20 milliGRAM(s) Oral at bedtime  enoxaparin Injectable 40 milliGRAM(s) SubCutaneous every 24 hours  perphenazine 32 milliGRAM(s) Oral <User Schedule>  perphenazine 8 milliGRAM(s) Oral at bedtime  phenazopyridine 100 milliGRAM(s) Oral every 8 hours  traZODone 100 milliGRAM(s) Oral at bedtime  valbenazine Capsule 40 milliGRAM(s) Oral daily    MEDICATIONS  (PRN):  acetaminophen     Tablet .. 650 milliGRAM(s) Oral every 6 hours PRN Temp greater or equal to 38C (100.4F), Mild Pain (1 - 3)  temazepam 15 milliGRAM(s) Oral at bedtime PRN Insomnia   **INCOMPLETE NOTE    OVERNIGHT EVENTS: none    SUBJECTIVE:  Patient seen and examined at bedside. Pt reporting ongoing weakness in legs feeling as though she is going to fall, as well as ongoing urinary frequency/incontinence with no improvement. Has not urinated since being brought to the floor. Denies paresthesia, numbness, back pain, hematuria, dysuria    Vital Signs Last 12 Hrs  T(F): 98.1 (07-02-25 @ 06:23), Max: 98.3 (07-01-25 @ 20:53)  HR: 65 (07-02-25 @ 06:23) (65 - 74)  BP: 108/70 (07-02-25 @ 06:23) (102/64 - 108/70)  BP(mean): 77 (07-01-25 @ 20:53) (77 - 77)  RR: 18 (07-02-25 @ 06:23) (17 - 18)  SpO2: 97% (07-02-25 @ 06:23) (96% - 97%)  I&O's Summary      PHYSICAL EXAM:  Constitutional: NAD, comfortable in bed.  HEENT: NC/AT, PERRLA, EOMI, no conjunctival pallor or scleral icterus, MMM  Neck: Supple, no JVD  Respiratory: CTA B/L. No w/r/r.   Cardiovascular: RRR, normal S1 and S2, no m/r/g.   Gastrointestinal: +BS, soft NTND, no guarding or rebound tenderness, no palpable masses   Extremities: wwp; no cyanosis, clubbing or edema.   Vascular: Pulses equal and strong throughout.   Neurological: AAOx3, CN II - XII grossly intact, strength 5/5 b/l upper and lower extremities, sensation intact throughout.   Skin: No gross skin abnormalities or rashes        LABS:                        14.0   6.06  )-----------( 162      ( 02 Jul 2025 07:00 )             42.3     07-02    137  |  105  |  13  ----------------------------<  114[H]  3.9   |  20[L]  |  0.75    Ca    9.2      02 Jul 2025 07:00  Phos  3.4     07-02  Mg     2.0     07-02    TPro  7.3  /  Alb  4.3  /  TBili  0.9  /  DBili  x   /  AST  29  /  ALT  40  /  AlkPhos  127[H]  06-30      Urinalysis Basic - ( 02 Jul 2025 07:00 )    Color: x / Appearance: x / SG: x / pH: x  Gluc: 114 mg/dL / Ketone: x  / Bili: x / Urobili: x   Blood: x / Protein: x / Nitrite: x   Leuk Esterase: x / RBC: x / WBC x   Sq Epi: x / Non Sq Epi: x / Bacteria: x          RADIOLOGY & ADDITIONAL TESTS:    MEDICATIONS  (STANDING):  atorvastatin 20 milliGRAM(s) Oral at bedtime  enoxaparin Injectable 40 milliGRAM(s) SubCutaneous every 24 hours  perphenazine 32 milliGRAM(s) Oral <User Schedule>  perphenazine 8 milliGRAM(s) Oral at bedtime  phenazopyridine 100 milliGRAM(s) Oral every 8 hours  traZODone 100 milliGRAM(s) Oral at bedtime  valbenazine Capsule 40 milliGRAM(s) Oral daily    MEDICATIONS  (PRN):  acetaminophen     Tablet .. 650 milliGRAM(s) Oral every 6 hours PRN Temp greater or equal to 38C (100.4F), Mild Pain (1 - 3)  temazepam 15 milliGRAM(s) Oral at bedtime PRN Insomnia     OVERNIGHT EVENTS: none    SUBJECTIVE:  Patient seen and examined at bedside. Pt reporting ongoing weakness in legs feeling as though she is going to fall, as well as ongoing urinary frequency/incontinence with no improvement. Has not urinated since being brought to the floor. Denies paresthesia, numbness, back pain, hematuria, dysuria    Vital Signs Last 12 Hrs  T(F): 98.1 (07-02-25 @ 06:23), Max: 98.3 (07-01-25 @ 20:53)  HR: 65 (07-02-25 @ 06:23) (65 - 74)  BP: 108/70 (07-02-25 @ 06:23) (102/64 - 108/70)  BP(mean): 77 (07-01-25 @ 20:53) (77 - 77)  RR: 18 (07-02-25 @ 06:23) (17 - 18)  SpO2: 97% (07-02-25 @ 06:23) (96% - 97%)  I&O's Summary      PHYSICAL EXAM:  Constitutional: NAD, comfortable in bed.  HEENT: NC/AT, PERRLA, EOMI, no conjunctival pallor or scleral icterus, MMM  Neck: Supple, no JVD  Respiratory: CTA B/L. No w/r/r.   Cardiovascular: RRR, normal S1 and S2, no m/r/g.   Gastrointestinal: +BS, soft NTND, no guarding or rebound tenderness, no palpable masses   Extremities: wwp; no cyanosis, clubbing or edema.   Vascular: Pulses equal and strong throughout.   Neurological: AAOx3, CN II - XII grossly intact, strength 5/5 b/l upper and lower extremities, sensation intact throughout.   Skin: No gross skin abnormalities or rashes        LABS:                        14.0   6.06  )-----------( 162      ( 02 Jul 2025 07:00 )             42.3     07-02    137  |  105  |  13  ----------------------------<  114[H]  3.9   |  20[L]  |  0.75    Ca    9.2      02 Jul 2025 07:00  Phos  3.4     07-02  Mg     2.0     07-02    TPro  7.3  /  Alb  4.3  /  TBili  0.9  /  DBili  x   /  AST  29  /  ALT  40  /  AlkPhos  127[H]  06-30      Urinalysis Basic - ( 02 Jul 2025 07:00 )    Color: x / Appearance: x / SG: x / pH: x  Gluc: 114 mg/dL / Ketone: x  / Bili: x / Urobili: x   Blood: x / Protein: x / Nitrite: x   Leuk Esterase: x / RBC: x / WBC x   Sq Epi: x / Non Sq Epi: x / Bacteria: x          RADIOLOGY & ADDITIONAL TESTS:    MEDICATIONS  (STANDING):  atorvastatin 20 milliGRAM(s) Oral at bedtime  enoxaparin Injectable 40 milliGRAM(s) SubCutaneous every 24 hours  perphenazine 32 milliGRAM(s) Oral <User Schedule>  perphenazine 8 milliGRAM(s) Oral at bedtime  phenazopyridine 100 milliGRAM(s) Oral every 8 hours  traZODone 100 milliGRAM(s) Oral at bedtime  valbenazine Capsule 40 milliGRAM(s) Oral daily    MEDICATIONS  (PRN):  acetaminophen     Tablet .. 650 milliGRAM(s) Oral every 6 hours PRN Temp greater or equal to 38C (100.4F), Mild Pain (1 - 3)  temazepam 15 milliGRAM(s) Oral at bedtime PRN Insomnia     OVERNIGHT EVENTS: none    SUBJECTIVE:  Patient seen and examined at bedside. Pt reporting ongoing weakness in legs feeling as though she is going to fall, as well as ongoing urinary frequency/incontinence with no improvement. Has not urinated since being brought to the floor. Denies paresthesia, numbness, back pain, hematuria, dysuria    Vital Signs Last 12 Hrs  T(F): 98.1 (07-02-25 @ 06:23), Max: 98.3 (07-01-25 @ 20:53)  HR: 65 (07-02-25 @ 06:23) (65 - 74)  BP: 108/70 (07-02-25 @ 06:23) (102/64 - 108/70)  BP(mean): 77 (07-01-25 @ 20:53) (77 - 77)  RR: 18 (07-02-25 @ 06:23) (17 - 18)  SpO2: 97% (07-02-25 @ 06:23) (96% - 97%)  I&O's Summary      PHYSICAL EXAM:  Constitutional: NAD, comfortable in bed.  Neck: Supple, no JVD  Respiratory: CTA B/L. No w/r/r.   Cardiovascular: RRR, normal S1 and S2, no m/r/g.   Gastrointestinal: +BS, soft NTND, no guarding or rebound tenderness, no palpable masses   Extremities: wwp; no cyanosis, clubbing or edema.   Vascular: Pulses equal and strong throughout.   Neurological: AAOx3, CN II - XII grossly intact, strength 5/5 b/l upper and lower extremities, sensation intact throughout.   Skin: No gross skin abnormalities or rashes        LABS:                        14.0   6.06  )-----------( 162      ( 02 Jul 2025 07:00 )             42.3     07-02    137  |  105  |  13  ----------------------------<  114[H]  3.9   |  20[L]  |  0.75    Ca    9.2      02 Jul 2025 07:00  Phos  3.4     07-02  Mg     2.0     07-02    TPro  7.3  /  Alb  4.3  /  TBili  0.9  /  DBili  x   /  AST  29  /  ALT  40  /  AlkPhos  127[H]  06-30      Urinalysis Basic - ( 02 Jul 2025 07:00 )    Color: x / Appearance: x / SG: x / pH: x  Gluc: 114 mg/dL / Ketone: x  / Bili: x / Urobili: x   Blood: x / Protein: x / Nitrite: x   Leuk Esterase: x / RBC: x / WBC x   Sq Epi: x / Non Sq Epi: x / Bacteria: x          RADIOLOGY & ADDITIONAL TESTS:    MEDICATIONS  (STANDING):  atorvastatin 20 milliGRAM(s) Oral at bedtime  enoxaparin Injectable 40 milliGRAM(s) SubCutaneous every 24 hours  perphenazine 32 milliGRAM(s) Oral <User Schedule>  perphenazine 8 milliGRAM(s) Oral at bedtime  phenazopyridine 100 milliGRAM(s) Oral every 8 hours  traZODone 100 milliGRAM(s) Oral at bedtime  valbenazine Capsule 40 milliGRAM(s) Oral daily    MEDICATIONS  (PRN):  acetaminophen     Tablet .. 650 milliGRAM(s) Oral every 6 hours PRN Temp greater or equal to 38C (100.4F), Mild Pain (1 - 3)  temazepam 15 milliGRAM(s) Oral at bedtime PRN Insomnia

## 2025-07-02 NOTE — PROGRESS NOTE ADULT - PROBLEM SELECTOR PLAN 5
F: tolerating PO, no IVF  E: replete K<4, Mg<2  N: regular  VTE Prophylaxis: Lovenox   GI: not needed  C: Full Code  D: RMF F: tolerating PO, no IVF  E: replete K<4, Mg<2  N: regular  VTE Prophylaxis: Lovenox   GI: not needed  C: Full Code  D: planning for MARIO

## 2025-07-02 NOTE — PROGRESS NOTE ADULT - ASSESSMENT
I M    80 yo F PMH schizophrenia, HLD, sent in from [place] with concerns of leg weakness, unsteady gait, and urinary incontinence, and tremors for the past  two months. She denies other neurologic sx including numbness, weakness, back pain, bowel incontinence, changes in vision, dizziness, headache.  Admitted for further management.       Problem/Plan - 1:  ·  Problem: Weakness.   ·  Plan: Pt presents with 2 month hx of weakness of her b/l lower extremities, inability to stand or walk. Previously did not have difficulty with ambulation. She denies other neurologic symptoms such as numbness, tingling, dizziness, bowel incontinence. Previously was able to ambulate normally. CT head reveals chronic small vessel disease, (-) for evidence of NPH; Limited neurologic exam WNL.  Outpatient note from 2023 states patient had MRI and EMG WNL    -PT consult.    Problem/Plan - 2:  ·  Problem: Urinary frequency.   ·  Plan: Pt has 2 month hx urinary incontinence and urinary frequency without dysuria, hematuria; Utox (-), UA (-); Was taking oxybutynin 5mg outpatient without relief of symptoms. Possibly urge/functional incontinence     - Hold oxybutinin  - trial of pyridium 100mg qd x3 days  - F/u A1c  - consider urology f/u outpatient.    Problem/Plan - 3:  ·  Problem: Schizophrenia.   ·  Plan: Hx schizophrenia, takes Trilafon (perphenazine) 32mg (16mg x 2tabs) QD, & 8mg QHS, Trazodone 100 Mg QHS, Ingrezza (valbenazine) 40mg QD, Restoril (temazapam) 15mg QHS    - cont. home meds.    Problem/Plan - 4:  ·  Problem: Hyperlipidemia.   ·  Plan: Hx hyperlipidemia, takes Lipitor 20mg QD   - cont. home meds.    Problem/Plan - 5:  ·  Problem: Prophylactic measure.   ·  Plan: F: tolerating PO, no IVF  E: replete K<4, Mg<2  N: regular  VTE Prophylaxis: Lovenox   GI: not needed  C: Full Code  D: RMF.

## 2025-07-02 NOTE — DISCHARGE NOTE PROVIDER - CARE PROVIDER_API CALL
Jose Elder  Neurology  [address]  [place] [address]  Phone: [phone]  Fax: [phone]  Follow Up Time: 1 week   Kiffer, Noah  Neurology  [address]   [place] [address]  Phone: [phone]  Fax: (   )    -  Follow Up Time:    Kiffer, Noah  Neurology  [address], [address]   [place] [address]  Phone: [phone]  Fax: (   )    -  Follow Up Time:     Dontrell Dahl  Urology  [address]  [place] [address]  Phone: [phone]  Fax: (   )    -  Scheduled Appointment: 07/18/2025 02:30 PM

## 2025-07-02 NOTE — DISCHARGE NOTE PROVIDER - NSDCFUADDAPPT_GEN_ALL_CORE_FT
(1) Please note that office of your Neurology Provider, Physician Assistant, Noah Kiffer will contact you from [phone] to schedule an appointment following your hospital discharge.    Appointment was facilitated by Ms. ANTHONY Webber, Referral Coordinator. Please bring your Insurance card, Photo ID and Discharge paperwork to the following appointment:    (1) Please follow up with your Urology Provider, Dr. Dontrell Dahl at [address] [place] [address], [place] [address] on 7/18/2025 at 2:30pm.    Appointment was scheduled by Ms. ANTHONY Webber, Referral Coordinator.      (2) Please note that office of your Neurology Provider, Physician Assistant, Noah Kiffer will contact you from [phone] to schedule an appointment following your hospital discharge.    Appointment was facilitated by Ms. ANTHONY Webber, Referral Coordinator.

## 2025-07-02 NOTE — DISCHARGE NOTE PROVIDER - NSDCCPCAREPLAN_GEN_ALL_CORE_FT
PRINCIPAL DISCHARGE DIAGNOSIS  Diagnosis: Unsteady gait  Assessment and Plan of Treatment: You were seen at [facility] for weakness in your legs and difficulty walking. You were found to have deconditioning and an unsteady gait. The CT scan of your head and MRI of your head and neck did not show any new pathology. It is recommended that you follow up with a neurologist/movement disorder specialist (Dr. Elder) within 1 week and your psychiatrist to discuss a possible medication adjustment as your medications may be causing your symptoms.      SECONDARY DISCHARGE DIAGNOSES  Diagnosis: Urinary frequency  Assessment and Plan of Treatment: You were seen at [facility] for urinary frequency/incontinence. A urinalysis was completed that showed no urinary tract infection. We recommend that you follow up with a urologist within 1 week

## 2025-07-02 NOTE — PROGRESS NOTE ADULT - PROBLEM SELECTOR PLAN 2
Pt has 2 month hx urinary incontinence and urinary frequency without dysuria, hematuria; Utox (-), UA (-); Was taking oxybutynin 5mg outpatient without relief of symptoms. Possibly urge/functional incontinence     - Hold oxybutinin  - trial of pyridium 100mg qd x3 days  - F/u A1c  - consider urology f/u outpatient Pt has 2 month hx urinary incontinence and urinary frequency without dysuria, hematuria; Utox (-), UA (-); Was taking oxybutynin 5mg outpatient without relief of symptoms. Possibly urge/functional incontinence     - Hold oxybutinin  - trial of pyridium 100mg qd x3 days  - q6h bladder scans  - consider urology f/u outpatient

## 2025-07-02 NOTE — DISCHARGE NOTE PROVIDER - NSDCFUSCHEDAPPT_GEN_ALL_CORE_FT
[facility] Physician [facility]  UROLOGY [address]  Scheduled Appointment: 07/18/2025

## 2025-07-02 NOTE — DISCHARGE NOTE PROVIDER - ATTENDING DISCHARGE PHYSICAL EXAMINATION:
Gen: reclining in bed at time of exam  HEENT: MMM, clear OP  CV: RRR, +S1/S2  Pulm: adequate respiratory effort, no increased work of breathing  Abd: soft, NT   Skin: warm and dry  Ext: WWP  Neuro: AOx3  Psych: affect and behavior appropriate

## 2025-07-02 NOTE — DISCHARGE NOTE PROVIDER - HOSPITAL COURSE
PATIENT NAME is a 78 y/o female with a past medical history of schizophrenia, HLD, sent in from [place] with concerns of leg weakness, unsteady gait, and urinary incontinence, and tremors for the past  two months.    Presented with lower extremity weakness, unsteady gait and urinary frequency/incontinence found to have deconditioning    Hospital Course by Problem List/Main Diagnoses:  #Generalized weakness  #Difficulty walking.   Plan: Pt presents with 2 month hx of weakness of her b/l lower extremities, inability to stand or walk. Previously did not have difficulty with ambulation. She denies other neurologic symptoms such as numbness, tingling, dizziness, bowel incontinence. Previously was able to ambulate normally. CT head reveals chronic small vessel disease, (-) for evidence of NPH; Limited neurologic exam WNL.  Outpatient note from 2023 states patient had MRI and EMG WNL  On exam, patient found to have hyperreflexia  MRI brain showed no acute intracranial pathology with   MRI c-spine showed     Plan:  - f/u with neurology/movement disorder specialist (Dr. Elder) within 1 week  - f/u with your psychiatrist for possible medication adjustment within 1 week    #Urinary frequency.   ·  Plan: Pt has 2 month hx urinary incontinence and urinary frequency without dysuria, hematuria; Utox (-), UA (-); Was taking oxybutynin 5mg outpatient without relief of symptoms. Possibly urge/functional incontinence     Plan  - f/u with outpatient urology within 1 week    #Schizophrenia.   Plan: Hx schizophrenia, takes Trilafon (perphenazine) 32mg (16mg x 2tabs) QD, & 8mg QHS, Trazodone 100 Mg QHS, Ingrezza (valbenazine) 40mg QD, Restoril (temazapam) 15mg QHS    - cont. home meds.    #Hyperlipidemia  Hx hyperlipidemia, takes Lipitor 20mg QD    Plan  - continue home meds      Patient was discharged to: [facility]    New medications: none  Changes to old medications: none  Medications that were stopped: none    Items to follow up as outpatient: generalized weakness, difficulty walking, urinary frequency    Physical exam at the time of discharge:  Gen: NAD, laying comfortably in bed  HEENT: NCAT, MMM, clear OP  Neck: trachea at midline  CV: RRR, +S1/S2 no m/r/g  Pulm: CTA b/l, no w/r/r  Abd: soft, nontender, no distension  Skin: warm and dry,   Ext: no LE edema   Neuro: Alert, oriented, strength 5/5 b/l upper and lower extremities, sensation intact throughout   Psych: affect and behavior appropriate, pleasant at time of interview   PATIENT NAME is a 80 y/o female with a past medical history of schizophrenia, HLD, sent in from [place] with concerns of leg weakness, unsteady gait, and urinary incontinence, and tremors for the past  two months.    Presented with lower extremity weakness, unsteady gait and urinary frequency/incontinence found to have deconditioning    Hospital Course by Problem List/Main Diagnoses:  #Generalized weakness  #Difficulty walking.   Plan: Pt presents with 2 month hx of weakness of her b/l lower extremities, inability to stand or walk. Previously did not have difficulty with ambulation. She denies other neurologic symptoms such as numbness, tingling, dizziness, bowel incontinence. Previously was able to ambulate normally. CT head reveals chronic small vessel disease, (-) for evidence of NPH; Limited neurologic exam WNL.  Outpatient note from 2023 states patient had MRI and EMG WNL  On exam, patient found to have hyperreflexia  MRI brain showed no acute intracranial pathology with   MRI c-spine showed     Plan:  - f/u with neurology/movement disorder specialist (Dr. Elder) within 1 week  - f/u with your psychiatrist for possible medication adjustment within 1 week  - discharge to [facility]    #Urinary frequency.   ·  Plan: Pt has 2 month hx urinary incontinence and urinary frequency without dysuria, hematuria; Utox (-), UA (-); Was taking oxybutynin 5mg outpatient without relief of symptoms. Possibly urge/functional incontinence     Plan  - f/u with outpatient urology within 1 week    #Schizophrenia.   Plan: Hx schizophrenia, takes Trilafon (perphenazine) 32mg (16mg x 2tabs) QD, & 8mg QHS, Trazodone 100 Mg QHS, Ingrezza (valbenazine) 40mg QD, Restoril (temazapam) 15mg QHS    - cont. home meds.    #Hyperlipidemia  Hx hyperlipidemia, takes Lipitor 20mg QD    Plan  - continue home meds      Patient was discharged to: [facility]    New medications: none  Changes to old medications: none  Medications that were stopped: none    Items to follow up as outpatient: generalized weakness, difficulty walking, urinary frequency    Physical exam at the time of discharge:  Gen: NAD, laying comfortably in bed  HEENT: NCAT, MMM, clear OP  Neck: trachea at midline  CV: RRR, +S1/S2 no m/r/g  Pulm: CTA b/l, no w/r/r  Abd: soft, nontender, no distension  Skin: warm and dry,   Ext: no LE edema   Neuro: Alert, oriented, strength 5/5 b/l upper and lower extremities, sensation intact throughout   Psych: affect and behavior appropriate, pleasant at time of interview   PATIENT NAME is a 80 y/o female with a past medical history of schizophrenia, HLD, sent in from [place] with concerns of leg weakness, unsteady gait, and urinary incontinence, and tremors for the past  two months.    Presented with lower extremity weakness, unsteady gait and urinary frequency/incontinence found to have deconditioning    Hospital Course by Problem List/Main Diagnoses:  #Generalized weakness  #Difficulty walking.   Plan: Pt presents with 2 month hx of weakness of her b/l lower extremities, inability to stand or walk. Previously did not have difficulty with ambulation. She denies other neurologic symptoms such as numbness, tingling, dizziness, bowel incontinence. Previously was able to ambulate normally. CT head reveals chronic small vessel disease, (-) for evidence of NPH; Limited neurologic exam WNL.  Outpatient note from 2023 states patient had MRI and EMG WNL  On exam, patient found to have hyperreflexia  MRI brain showed no acute intracranial pathology with   MRI c-spine showed     Plan:  - f/u with neurology/movement disorder specialist (Dr. Elder) within 1 week  - f/u with your psychiatrist for possible medication adjustment within 1 week  - discharge to [facility]    #Urinary frequency.   ·  Plan: Pt has 2 month hx urinary incontinence and urinary frequency without dysuria, hematuria; Utox (-), UA (-); Was taking oxybutynin 5mg outpatient without relief of symptoms. Possibly urge/functional incontinence     Plan  - f/u with outpatient urology within 1 week    #Schizophrenia.   Plan: Hx schizophrenia, takes Trilafon (perphenazine) 32mg (16mg x 2tabs) QD, & 8mg QHS, Trazodone 100 Mg QHS, Ingrezza (valbenazine) 40mg QD, Restoril (temazapam) 15mg QHS    - cont. home meds.    #Hyperlipidemia  Hx hyperlipidemia, takes Lipitor 20mg QD    Plan  - continue home meds      Patient was discharged to: [facility]    New medications: none  Changes to old medications: none  Medications that were stopped: none    Items to follow up as outpatient: generalized weakness, difficulty walking, urinary frequency    Physical exam at the time of discharge:  Gen: NAD, laying comfortably in bed  HEENT: NCAT, MMM, clear OP  Neck: trachea at midline  CV: RRR, +S1/S2 no m/r/g  Pulm: CTA b/l, no w/r/r  Abd: soft, nontender, no distension  Skin: warm and dry,   Ext: no LE edema   Neuro: Alert, oriented, strength 5/5 b/l upper and lower extremities, sensation intact throughout   Psych: affect and behavior appropriate, pleasant at time of interview    *****************Attending attestation*************  80 y/o female with a past medical history of schizophrenia, HLD, sent in from [place] with concerns of leg weakness, unsteady gait, and urinary incontinence, and tremors for the past  two months. Underwent MRI cervical spine which did not show myelopathy ; underwent MRI brain w/wo contrast, which did not show any pathology that would explain patient's symptoms. Evaluated by neurology - extrapyramidal symptoms likely 2/2 antipsychotic medications, recommend outpatient follow up with psychiatry to discuss modifying medication regimen to manage extrapyramidal symptoms. Also recommending outpatient follow up with movement disorders specialist, possible Moisés scan as outpatient. Counseled patient on recommendations, and wrote down recommendations for patient.

## 2025-07-02 NOTE — DISCHARGE NOTE PROVIDER - DISCHARGING ATTENDING PHYSICIAN:
Patient discharged to home via wheelchair. Pt alert and talkative, vitals stable on room air, tolerating PO intake. Discharge instructions (written and verbal) and follow-up information given to patient's parent who verbalized understanding, as well as a readiness for discharge. C contact info provided for additional questions following discharge.   Steffany Nathan

## 2025-07-03 DIAGNOSIS — R53.1 WEAKNESS: ICD-10-CM

## 2025-07-03 DIAGNOSIS — R26.2 DIFFICULTY IN WALKING, NOT ELSEWHERE CLASSIFIED: ICD-10-CM

## 2025-07-03 LAB
ALBUMIN SERPL ELPH-MCNC: 3.9 G/DL — SIGNIFICANT CHANGE UP (ref 3.3–5)
ALP SERPL-CCNC: 101 U/L — SIGNIFICANT CHANGE UP (ref 40–120)
ALT FLD-CCNC: 22 U/L — SIGNIFICANT CHANGE UP (ref 10–45)
ANION GAP SERPL CALC-SCNC: 10 MMOL/L — SIGNIFICANT CHANGE UP (ref 5–17)
AST SERPL-CCNC: 24 U/L — SIGNIFICANT CHANGE UP (ref 10–40)
BASOPHILS # BLD AUTO: 0.05 K/UL — SIGNIFICANT CHANGE UP (ref 0–0.2)
BASOPHILS NFR BLD AUTO: 1 % — SIGNIFICANT CHANGE UP (ref 0–2)
BILIRUB SERPL-MCNC: 0.5 MG/DL — SIGNIFICANT CHANGE UP (ref 0.2–1.2)
BLD GP AB SCN SERPL QL: NEGATIVE — SIGNIFICANT CHANGE UP
BUN SERPL-MCNC: 19 MG/DL — SIGNIFICANT CHANGE UP (ref 7–23)
CALCIUM SERPL-MCNC: 9 MG/DL — SIGNIFICANT CHANGE UP (ref 8.4–10.5)
CHLORIDE SERPL-SCNC: 106 MMOL/L — SIGNIFICANT CHANGE UP (ref 96–108)
CO2 SERPL-SCNC: 22 MMOL/L — SIGNIFICANT CHANGE UP (ref 22–31)
CREAT SERPL-MCNC: 0.92 MG/DL — SIGNIFICANT CHANGE UP (ref 0.5–1.3)
EGFR: 64 ML/MIN/1.73M2 — SIGNIFICANT CHANGE UP
EGFR: 64 ML/MIN/1.73M2 — SIGNIFICANT CHANGE UP
EOSINOPHIL # BLD AUTO: 0.12 K/UL — SIGNIFICANT CHANGE UP (ref 0–0.5)
EOSINOPHIL NFR BLD AUTO: 2.4 % — SIGNIFICANT CHANGE UP (ref 0–6)
GLUCOSE SERPL-MCNC: 111 MG/DL — HIGH (ref 70–99)
HCT VFR BLD CALC: 43.3 % — SIGNIFICANT CHANGE UP (ref 34.5–45)
HGB BLD-MCNC: 13.8 G/DL — SIGNIFICANT CHANGE UP (ref 11.5–15.5)
IMM GRANULOCYTES # BLD AUTO: 0.02 K/UL — SIGNIFICANT CHANGE UP (ref 0–0.07)
IMM GRANULOCYTES NFR BLD AUTO: 0.4 % — SIGNIFICANT CHANGE UP (ref 0–0.9)
LYMPHOCYTES # BLD AUTO: 1.01 K/UL — SIGNIFICANT CHANGE UP (ref 1–3.3)
LYMPHOCYTES NFR BLD AUTO: 20.1 % — SIGNIFICANT CHANGE UP (ref 13–44)
MAGNESIUM SERPL-MCNC: 2.2 MG/DL — SIGNIFICANT CHANGE UP (ref 1.6–2.6)
MCHC RBC-ENTMCNC: 30.7 PG — SIGNIFICANT CHANGE UP (ref 27–34)
MCHC RBC-ENTMCNC: 31.9 G/DL — LOW (ref 32–36)
MCV RBC AUTO: 96.2 FL — SIGNIFICANT CHANGE UP (ref 80–100)
MONOCYTES # BLD AUTO: 0.55 K/UL — SIGNIFICANT CHANGE UP (ref 0–0.9)
MONOCYTES NFR BLD AUTO: 11 % — SIGNIFICANT CHANGE UP (ref 2–14)
NEUTROPHILS # BLD AUTO: 3.27 K/UL — SIGNIFICANT CHANGE UP (ref 1.8–7.4)
NEUTROPHILS NFR BLD AUTO: 65.1 % — SIGNIFICANT CHANGE UP (ref 43–77)
NRBC # BLD AUTO: 0 K/UL — SIGNIFICANT CHANGE UP (ref 0–0)
NRBC # FLD: 0 K/UL — SIGNIFICANT CHANGE UP (ref 0–0)
NRBC BLD AUTO-RTO: 0 /100 WBCS — SIGNIFICANT CHANGE UP (ref 0–0)
PHOSPHATE SERPL-MCNC: 4 MG/DL — SIGNIFICANT CHANGE UP (ref 2.5–4.5)
PLATELET # BLD AUTO: 131 K/UL — LOW (ref 150–400)
PMV BLD: 11 FL — SIGNIFICANT CHANGE UP (ref 7–13)
POTASSIUM SERPL-MCNC: 4.2 MMOL/L — SIGNIFICANT CHANGE UP (ref 3.5–5.3)
POTASSIUM SERPL-SCNC: 4.2 MMOL/L — SIGNIFICANT CHANGE UP (ref 3.5–5.3)
PROT SERPL-MCNC: 6 G/DL — SIGNIFICANT CHANGE UP (ref 6–8.3)
RBC # BLD: 4.5 M/UL — SIGNIFICANT CHANGE UP (ref 3.8–5.2)
RBC # FLD: 12.3 % — SIGNIFICANT CHANGE UP (ref 10.3–14.5)
RH IG SCN BLD-IMP: POSITIVE — SIGNIFICANT CHANGE UP
SODIUM SERPL-SCNC: 138 MMOL/L — SIGNIFICANT CHANGE UP (ref 135–145)
WBC # BLD: 5.02 K/UL — SIGNIFICANT CHANGE UP (ref 3.8–10.5)
WBC # FLD AUTO: 5.02 K/UL — SIGNIFICANT CHANGE UP (ref 3.8–10.5)

## 2025-07-03 PROCEDURE — 99232 SBSQ HOSP IP/OBS MODERATE 35: CPT

## 2025-07-03 RX ADMIN — Medication 100 MILLIGRAM(S): at 05:48

## 2025-07-03 RX ADMIN — Medication 100 MILLIGRAM(S): at 13:29

## 2025-07-03 RX ADMIN — PERPHENAZINE 32 MILLIGRAM(S): 2 TABLET, FILM COATED ORAL at 09:47

## 2025-07-03 RX ADMIN — ENOXAPARIN SODIUM 40 MILLIGRAM(S): 100 INJECTION SUBCUTANEOUS at 23:15

## 2025-07-03 RX ADMIN — PERPHENAZINE 8 MILLIGRAM(S): 2 TABLET, FILM COATED ORAL at 23:15

## 2025-07-03 RX ADMIN — VALBENAZINE 40 MILLIGRAM(S): 80 CAPSULE ORAL at 13:29

## 2025-07-03 RX ADMIN — Medication 100 MILLIGRAM(S): at 23:15

## 2025-07-03 RX ADMIN — ATORVASTATIN CALCIUM 20 MILLIGRAM(S): 80 TABLET, FILM COATED ORAL at 23:15

## 2025-07-03 NOTE — PROGRESS NOTE ADULT - ASSESSMENT
Azra Mckenna is a 80 yo F PMH schizophrenia, HLD, sent in from [place] with concerns of leg weakness, unsteady gait, and urinary incontinence, and tremors for the past  two months. She denies other neurologic sx including numbness, weakness, back pain, bowel incontinence, changes in vision, dizziness, headache.  Admitted for further management

## 2025-07-03 NOTE — PROGRESS NOTE ADULT - SUBJECTIVE AND OBJECTIVE BOX
OVERNIGHT EVENTS: none    SUBJECTIVE:  Patient seen and examined at bedside. Pt reports that leg weakness and urinary frequency/incontinence has been continuing and has no improvement or worsening of symptoms. Denies paresthesia, numbness, back pain, hematuria, dysuria    Vital Signs Last 12 Hrs  T(F): 98.1 (07-02-25 @ 06:23), Max: 98.3 (07-01-25 @ 20:53)  HR: 65 (07-02-25 @ 06:23) (65 - 74)  BP: 108/70 (07-02-25 @ 06:23) (102/64 - 108/70)  BP(mean): 77 (07-01-25 @ 20:53) (77 - 77)  RR: 18 (07-02-25 @ 06:23) (17 - 18)  SpO2: 97% (07-02-25 @ 06:23) (96% - 97%)  I&O's Summary      PHYSICAL EXAM:  Constitutional: NAD, comfortable in bed.  Neck: Supple, no JVD  Respiratory: CTA B/L. No w/r/r.   Cardiovascular: RRR, normal S1 and S2, no m/r/g.   Gastrointestinal: +BS, soft NTND, no guarding or rebound tenderness, no palpable masses   Extremities: wwp; no cyanosis, clubbing or edema.   Vascular: Pulses equal and strong throughout.   Neurological: AAOx3, CN II - XII grossly intact, strength 5/5 b/l upper and lower extremities, sensation intact throughout.   Skin: No gross skin abnormalities or rashes        LABS:                        14.0   6.06  )-----------( 162      ( 02 Jul 2025 07:00 )             42.3     07-02    137  |  105  |  13  ----------------------------<  114[H]  3.9   |  20[L]  |  0.75    Ca    9.2      02 Jul 2025 07:00  Phos  3.4     07-02  Mg     2.0     07-02    TPro  7.3  /  Alb  4.3  /  TBili  0.9  /  DBili  x   /  AST  29  /  ALT  40  /  AlkPhos  127[H]  06-30      Urinalysis Basic - ( 02 Jul 2025 07:00 )    Color: x / Appearance: x / SG: x / pH: x  Gluc: 114 mg/dL / Ketone: x  / Bili: x / Urobili: x   Blood: x / Protein: x / Nitrite: x   Leuk Esterase: x / RBC: x / WBC x   Sq Epi: x / Non Sq Epi: x / Bacteria: x          RADIOLOGY & ADDITIONAL TESTS:    MEDICATIONS  (STANDING):  atorvastatin 20 milliGRAM(s) Oral at bedtime  enoxaparin Injectable 40 milliGRAM(s) SubCutaneous every 24 hours  perphenazine 32 milliGRAM(s) Oral <User Schedule>  perphenazine 8 milliGRAM(s) Oral at bedtime  phenazopyridine 100 milliGRAM(s) Oral every 8 hours  traZODone 100 milliGRAM(s) Oral at bedtime  valbenazine Capsule 40 milliGRAM(s) Oral daily    MEDICATIONS  (PRN):  acetaminophen     Tablet .. 650 milliGRAM(s) Oral every 6 hours PRN Temp greater or equal to 38C (100.4F), Mild Pain (1 - 3)  temazepam 15 milliGRAM(s) Oral at bedtime PRN Insomnia

## 2025-07-03 NOTE — PROGRESS NOTE ADULT - PROBLEM SELECTOR PLAN 5
F: tolerating PO, no IVF  E: replete K<4, Mg<2  N: regular  VTE Prophylaxis: Lovenox   GI: not needed  C: Full Code  D: planning for [facility] F: tolerating PO, no IVF  E: replete K<4, Mg<2  N: regular  VTE Prophylaxis: Lovenox   GI: not needed  C: Full Code  D: 7u Hx hyperlipidemia, takes Lipitor 20mg QD   - cont. home meds

## 2025-07-03 NOTE — PROGRESS NOTE ADULT - PROBLEM SELECTOR PLAN 3
Hx schizophrenia, takes Trilafon (perphenazine) 32mg (16mg x 2tabs) QD, & 8mg QHS, Trazodone 100 Mg QHS, Ingrezza (valbenazine) 40mg QD, Restoril (temazapam) 15mg QHS    - cont. home meds Pt has 2 month hx urinary incontinence and urinary frequency without dysuria, hematuria; Utox (-), UA (-); Was taking oxybutynin 5mg outpatient without relief of symptoms. Possibly urge/functional incontinence     - Hold oxybutinin  - trial of pyridium 100mg qd x3 days  - q6h bladder scans  - consider urology f/u outpatient

## 2025-07-03 NOTE — PROGRESS NOTE ADULT - PROBLEM SELECTOR PLAN 1
Pt presents with 2 month hx of weakness of her b/l lower extremities, inability to stand or walk. Previously did not have difficulty with ambulation. She denies other neurologic symptoms such as numbness, tingling, dizziness, bowel incontinence. Previously was able to ambulate normally. CT head reveals chronic small vessel disease, (-) for evidence of NPH; Limited neurologic exam WNL.  Outpatient note from 2023 states patient had MRI and EMG WNL    -PT recommending [facility]

## 2025-07-03 NOTE — PROGRESS NOTE ADULT - PROBLEM SELECTOR PLAN 2
Pt has 2 month hx urinary incontinence and urinary frequency without dysuria, hematuria; Utox (-), UA (-); Was taking oxybutynin 5mg outpatient without relief of symptoms. Possibly urge/functional incontinence     - Hold oxybutinin  - trial of pyridium 100mg qd x3 days  - q6h bladder scans  - consider urology f/u outpatient Pt presents with 2 month hx of weakness of her b/l lower extremities, inability to stand or walk. Previously did not have difficulty with ambulation. She denies other neurologic symptoms such as numbness, tingling, dizziness, bowel incontinence. Previously was able to ambulate normally. CT head reveals chronic small vessel disease, (-) for evidence of NPH; Limited neurologic exam WNL.  Outpatient note from 2023 states patient had MRI and EMG WNL    -PT recommending MARIO

## 2025-07-03 NOTE — PROGRESS NOTE ADULT - PROBLEM SELECTOR PLAN 4
Hx hyperlipidemia, takes Lipitor 20mg QD   - cont. home meds Hx schizophrenia, takes Trilafon (perphenazine) 32mg (16mg x 2tabs) QD, & 8mg QHS, Trazodone 100 Mg QHS, Ingrezza (valbenazine) 40mg QD, Restoril (temazapam) 15mg QHS    - cont. home meds

## 2025-07-03 NOTE — PROGRESS NOTE ADULT - PROBLEM SELECTOR PLAN 6
F: tolerating PO, no IVF  E: replete K<4, Mg<2  N: regular  VTE Prophylaxis: Lovenox   GI: not needed  C: Full Code  D: 7u

## 2025-07-04 LAB
ALBUMIN SERPL ELPH-MCNC: 3.6 G/DL — SIGNIFICANT CHANGE UP (ref 3.3–5)
ALP SERPL-CCNC: 95 U/L — SIGNIFICANT CHANGE UP (ref 40–120)
ALT FLD-CCNC: 24 U/L — SIGNIFICANT CHANGE UP (ref 10–45)
ANION GAP SERPL CALC-SCNC: 9 MMOL/L — SIGNIFICANT CHANGE UP (ref 5–17)
AST SERPL-CCNC: 30 U/L — SIGNIFICANT CHANGE UP (ref 10–40)
BILIRUB SERPL-MCNC: 0.4 MG/DL — SIGNIFICANT CHANGE UP (ref 0.2–1.2)
BUN SERPL-MCNC: 15 MG/DL — SIGNIFICANT CHANGE UP (ref 7–23)
CALCIUM SERPL-MCNC: 8.8 MG/DL — SIGNIFICANT CHANGE UP (ref 8.4–10.5)
CHLORIDE SERPL-SCNC: 103 MMOL/L — SIGNIFICANT CHANGE UP (ref 96–108)
CO2 SERPL-SCNC: 24 MMOL/L — SIGNIFICANT CHANGE UP (ref 22–31)
CREAT SERPL-MCNC: 0.64 MG/DL — SIGNIFICANT CHANGE UP (ref 0.5–1.3)
EGFR: 91 ML/MIN/1.73M2 — SIGNIFICANT CHANGE UP
EGFR: 91 ML/MIN/1.73M2 — SIGNIFICANT CHANGE UP
GLUCOSE SERPL-MCNC: 137 MG/DL — HIGH (ref 70–99)
MAGNESIUM SERPL-MCNC: 2.1 MG/DL — SIGNIFICANT CHANGE UP (ref 1.6–2.6)
POTASSIUM SERPL-MCNC: 4.3 MMOL/L — SIGNIFICANT CHANGE UP (ref 3.5–5.3)
POTASSIUM SERPL-SCNC: 4.3 MMOL/L — SIGNIFICANT CHANGE UP (ref 3.5–5.3)
PROT SERPL-MCNC: 5.7 G/DL — LOW (ref 6–8.3)
SODIUM SERPL-SCNC: 136 MMOL/L — SIGNIFICANT CHANGE UP (ref 135–145)

## 2025-07-04 PROCEDURE — 99222 1ST HOSP IP/OBS MODERATE 55: CPT

## 2025-07-04 PROCEDURE — 99233 SBSQ HOSP IP/OBS HIGH 50: CPT | Mod: GC

## 2025-07-04 RX ORDER — CYST/ALA/Q10/PHOS.SER/DHA/BROC 100-20-50
1 POWDER (GRAM) ORAL DAILY
Refills: 0 | Status: DISCONTINUED | OUTPATIENT
Start: 2025-07-04 | End: 2025-07-07

## 2025-07-04 RX ADMIN — ATORVASTATIN CALCIUM 20 MILLIGRAM(S): 80 TABLET, FILM COATED ORAL at 21:11

## 2025-07-04 RX ADMIN — PERPHENAZINE 32 MILLIGRAM(S): 2 TABLET, FILM COATED ORAL at 09:41

## 2025-07-04 RX ADMIN — PERPHENAZINE 8 MILLIGRAM(S): 2 TABLET, FILM COATED ORAL at 21:12

## 2025-07-04 RX ADMIN — Medication 105 MILLIGRAM(S): at 21:10

## 2025-07-04 RX ADMIN — ENOXAPARIN SODIUM 40 MILLIGRAM(S): 100 INJECTION SUBCUTANEOUS at 21:12

## 2025-07-04 RX ADMIN — VALBENAZINE 40 MILLIGRAM(S): 80 CAPSULE ORAL at 11:38

## 2025-07-04 RX ADMIN — Medication 1 TABLET(S): at 21:11

## 2025-07-04 RX ADMIN — Medication 100 MILLIGRAM(S): at 21:11

## 2025-07-04 NOTE — PROGRESS NOTE ADULT - ASSESSMENT
Azra Mckenna is a 80 yo F PMH schizophrenia, HLD, sent in from [place] with concerns of leg weakness, unsteady gait, and urinary incontinence, and tremors for the past  two months. She denies other neurologic sx including numbness, weakness, back pain, bowel incontinence, changes in vision, dizziness, headache.  Admitted for further management

## 2025-07-04 NOTE — PROGRESS NOTE ADULT - PROBLEM SELECTOR PLAN 6
F: tolerating PO, no IVF  E: replete K<4, Mg<2  N: regular  VTE Prophylaxis: Lovenox   GI: not needed  C: Full Code  D: Dispo to [facility] pending auth

## 2025-07-04 NOTE — PROGRESS NOTE ADULT - SUBJECTIVE AND OBJECTIVE BOX
Physical Medicine and Rehabilitation Progress Note :       Patient is a 79y old  Female who presents with a chief complaint of Leg weakness, urinary incontinence (03 Jul 2025 06:51)      HPI:  Pt is a 75 yo F PMH schizophrenia, HLD, admitted for weakness, unsteady gait and urinary incontinence/ frequency. She came to the ED from [facility], and reports a 2 month hx of feeling unsteady in her legs, unable to stand or walk, with tremors. She previously was able to walk without much difficulty, reports that she was not using a walker or cane. No inciting events or traumas. She denies headache, dizziness, changes in vision or hearing. She also endorses a 2 month hx of urinary incontinence, and was started on Oxybutynin 2 weeks ago, which she no longer takes stating that it worsens her symptoms. She endorses not being able to make it to the bathroom in time, sometimes urinating 16x/ hour. She reports normal appetite, eating three meals per day, and does not feel excessively thirsty. Otherwise she denies F/C/ N/V diarrhea, constipation, bowel incontinence, back pain, numbness or tingling of lower extremities, dysuria, hematuria, hx of UTI's.       In the ED:  Initial vital signs: T: 98.1 F, HR: 73, BP: 100s-170s/60s-80s, R: 16, SpO2: 98% on RA  Labs: unremarkable including CBC, BMP, UA, Utox  Imaging:  - CT head reveals chronic small vessel disease  EKG:  NSR at 65 BPM, No ST changes  Medications: Ativan .5mg, 1000ml NS bolus  Consults: none  (01 Jul 2025 07:15)                            13.8   5.02  )-----------( 131      ( 03 Jul 2025 05:30 )             43.3       07-03    138  |  106  |  19  ----------------------------<  111[H]  4.2   |  22  |  0.92    Ca    9.0      03 Jul 2025 05:30  Phos  4.0     07-03  Mg     2.2     07-03    TPro  6.0  /  Alb  3.9  /  TBili  0.5  /  DBili  x   /  AST  24  /  ALT  22  /  AlkPhos  101  07-03    Vital Signs Last 24 Hrs  T(C): 36.6 (04 Jul 2025 05:50), Max: 37 (03 Jul 2025 12:08)  T(F): 97.9 (04 Jul 2025 05:50), Max: 98.6 (03 Jul 2025 12:08)  HR: 53 (04 Jul 2025 05:50) (53 - 60)  BP: 117/70 (04 Jul 2025 05:50) (110/69 - 121/70)  BP(mean): --  RR: 17 (04 Jul 2025 05:50) (17 - 18)  SpO2: 95% (04 Jul 2025 05:50) (95% - 96%)    Parameters below as of 04 Jul 2025 05:50  Patient On (Oxygen Delivery Method): room air        MEDICATIONS  (STANDING):  atorvastatin 20 milliGRAM(s) Oral at bedtime  enoxaparin Injectable 40 milliGRAM(s) SubCutaneous every 24 hours  perphenazine 32 milliGRAM(s) Oral <User Schedule>  perphenazine 8 milliGRAM(s) Oral at bedtime  traZODone 100 milliGRAM(s) Oral at bedtime  valbenazine Capsule 40 milliGRAM(s) Oral daily    MEDICATIONS  (PRN):  acetaminophen     Tablet .. 650 milliGRAM(s) Oral every 6 hours PRN Temp greater or equal to 38C (100.4F), Mild Pain (1 - 3)  temazepam 15 milliGRAM(s) Oral at bedtime PRN Insomnia      T(C): 36.6 (07-04-25 @ 05:50), Max: 37 (07-03-25 @ 12:08)  HR: 53 (07-04-25 @ 05:50) (53 - 60)  BP: 117/70 (07-04-25 @ 05:50) (110/69 - 121/70)  RR: 17 (07-04-25 @ 05:50) (17 - 18)  SpO2: 95% (07-04-25 @ 05:50) (95% - 96%)    Physical Exam:     79 y o woman lying comfortably in semi Medeiros's position , awake , alert , no acute complaints     Head: normocephalic , atraumatic    Eyes: PERRLA , EOMI , no nystagmus , sclera anicteric    ENT / FACE: neg nasal discharge , uvula midline , no oropharyngeal erythema / exudate    Neck: supple , negative JVD , negative carotid bruits , no thyromegaly    Chest: CTA bilaterally     Cardiovascular: regular rate and rhythm , neg murmurs / rubs / gallops    Abdomen: soft , non distended , no tenderness to palpation in all 4 quadrants ,  normal bowel sounds     Extremities: WWP , neg cyanosis /clubbing / edema     Neurologic Exam:     Alert and oriented to person , place , date/year , speech fluent w/o dysarthria     Cranial Nerves:           II:                         pupils equal , round and reactive to light , visual fields intact         III/ IV/VI:             extraocular movements intact , neg nystagmus , neg ptosis        V:                        facial sensation intact , V1-3 normal        VII:                      face symmetric , no droop , normal eye closure and smile        VIII:                     hearing intact to finger rub bilaterally        IX and X:             no hoarseness , gag intact , palate/ uvula rise symmetrically        XI:                       SCM / trapezius strength intact bilateral        XII:                      no tongue deviation    Motor Exam:        > 3+/5 x 4 extremities , without drift     Sensation:         intact to light touch x 4 extremities                            no neglect or extinction on double simultaneous testing    DTR:           biceps/brachioradialis: equal                            patella/ankle: equal          neg Babinski      Coordination:            Finger to Nose:  neg dysmetria bilaterally      7/3/2025  Functional Status Assessment :       Pain Assessment/Number Scale (0-10) Adult  Presence of Pain: denies pain/discomfort (Rating = 0)  Pain Rating (0-10): Rest: 0 (no pain/absence of nonverbal indicators of pain)  Pain Rating (0-10): Activity: 0 (no pain/absence of nonverbal indicators of pain)    Safety      [facility] Functional Assessment: Basic Mobility  Type of Assessment: Daily assessment  Turning from your back to your side while in a flat bed without using bedrails?: 3 = A little assistance  Moving from lying on your back to sitting on the flat side of a flat bed without using bedrails?: 3 = A little assistance  Moving to and from a bed to a chair (including a wheelchair)?: 3 = A little assistance  Standing up from a chair using your arms (e.g. wheelchair or bedside chair)?: 3 = A little assistance  Walking in hospital room?: 3 = A little assistance  Climbing 3-5 steps with a railing?:    3-calculated by average   Score: 18   Row Comment: Ask the patient "How much help from another person do you currently need? (If the patient hasn't done an activity recently, how much help from another person do you think he/she needs if he/she tried?)    Cognitive/Neuro      Cognitive/Neuro/Behavioral  Cognitive/Neuro/Behavioral [WDL Definition: Alert; opens eyes spontaneously; arouses to voice or touch; oriented x 4; follows commands; speech spontaneous, logical; purposeful motor response; behavior appropriate to situation]: WDL  Mood/Behavior: flat affect    Language Assistance  Preferred Language to Address Healthcare Preferred Language to Address Healthcare: English    Therapeutic Interventions      Bed Mobility  Bed Mobility Training Rolling/Turning: contact guard;  verbal cues;  1 person assist  Bed Mobility Training Scooting: contact guard;  verbal cues;  1 person assist  Bed Mobility Training Sit-to-Supine: contact guard;  verbal cues;  1 person assist  Bed Mobility Training Supine-to-Sit: contact guard;  verbal cues;  1 person assist  Bed Mobility Training Limitations: decreased strength;  impaired balance    Sit-Stand Transfer Training  Transfer Training Sit-to-Stand Transfer: minimum assist (75% patient effort);  verbal cues;  1 person assist;  rolling walker  Transfer Training Stand-to-Sit Transfer: minimum assist (75% patient effort);  verbal cues;  1 person assist;  rolling walker  Sit-to-Stand Transfer Training Transfer Safety Analysis: decreased weight-shifting ability;  decreased strength;  impaired balance    Gait Training  Gait Training: minimum assist (75% patient effort);  verbal cues;  1 person assist;  rolling walker;  40ft x 2  Gait Analysis: 2-point gait   patient with mildly unsteady gait, no overt LOB, verbal cues and physical assist required to avoid obstacles in hallway;  decreased luiz;  decreased step length;  decreased strength;  impaired balance    Therapeutic Exercise  Therapeutic Exercise Detail: LAQ X 10, seated marches x 10             PM&R Impression : as above    Current disposition plan recommendation :    subacute rehab placement

## 2025-07-04 NOTE — CONSULT NOTE ADULT - ASSESSMENT
78 yo F w/ PMHx of schizophrenia, HLD from [facility] admitted to Medicine for weakness, difficulty ambulation and urinary frequency. Neurology consulted for difficulty walking and leg weakness. On exam, patient shows signs of underlying parkinsonism likely due to medications. However, patient also has weakness in b/l UE and LE with hyperreflexia. Myelopathy should be suspected in this case.     Recommendations  - Obtain MRI cervical w/o    Case discussed with attending Dr. Campa.   80 yo F w/ PMHx of schizophrenia, HLD from [facility] admitted to Medicine for weakness, difficulty ambulation and urinary frequency. Neurology consulted for difficulty walking and leg weakness. On exam, patient shows signs of underlying parkinsonism likely due to medications. However, patient also has weakness in b/l UE and LE with hyperreflexia. Myelopathy should be suspected in this case.     Recommendations:  - Obtain MRI brain w/wo contrast  - Obtain MRI cervical w/o contrast  - Please send vitamin B12 levels, MMA, homocysteine, zinc  - Will continue to f/u    Case discussed with attending Dr. Campa.

## 2025-07-04 NOTE — PROGRESS NOTE ADULT - PROBLEM SELECTOR PLAN 4
Hx schizophrenia, takes Trilafon (perphenazine) 32mg (16mg x 2tabs) QD, & 8mg QHS, Trazodone 100 Mg QHS, Ingrezza (valbenazine) 40mg QD, Restoril (temazapam) 15mg QHS    - cont. home meds

## 2025-07-04 NOTE — PROGRESS NOTE ADULT - ASSESSMENT
I M     80 yo F PMH schizophrenia, HLD, sent in from [place] with concerns of leg weakness, unsteady gait, and urinary incontinence, and tremors for the past  two months. She denies other neurologic sx including numbness, weakness, back pain, bowel incontinence, changes in vision, dizziness, headache.  Admitted for further management      Problem/Plan - 1:  ·  Problem: Generalized weakness.  ·  Plan: Pt presents with 2 month hx of weakness of her b/l lower extremities, inability to stand or walk. Previously did not have difficulty with ambulation. She denies other neurologic symptoms such as numbness, tingling, dizziness, bowel incontinence. Previously was able to ambulate normally. CT head reveals chronic small vessel disease, (-) for evidence of NPH; Limited neurologic exam WNL.  Outpatient note from 2023 states patient had MRI and EMG WNL    -PT recommending MARIO.    Problem/Plan - 2:  ·  Problem: Difficulty walking.  ·  Plan: Pt presents with 2 month hx of weakness of her b/l lower extremities, inability to stand or walk. Previously did not have difficulty with ambulation. She denies other neurologic symptoms such as numbness, tingling, dizziness, bowel incontinence. Previously was able to ambulate normally. CT head reveals chronic small vessel disease, (-) for evidence of NPH; Limited neurologic exam WNL.  Outpatient note from 2023 states patient had MRI and EMG WNL    -PT recommending MARIO.    Problem/Plan - 3:  ·  Problem: Urinary frequency.  ·  Plan: Pt has 2 month hx urinary incontinence and urinary frequency without dysuria, hematuria; Utox (-), UA (-); Was taking oxybutynin 5mg outpatient without relief of symptoms. Possibly urge/functional incontinence     - Hold oxybutinin  - trial of pyridium 100mg qd x3 days  - q6h bladder scans  - consider urology f/u outpatient.    Problem/Plan - 4:  ·  Problem: Schizophrenia.  ·  Plan: Hx schizophrenia, takes Trilafon (perphenazine) 32mg (16mg x 2tabs) QD, & 8mg QHS, Trazodone 100 Mg QHS, Ingrezza (valbenazine) 40mg QD, Restoril (temazapam) 15mg QHS    - cont. home meds.    Problem/Plan - 5:  ·  Problem: Hyperlipidemia.  ·  Plan: Hx hyperlipidemia, takes Lipitor 20mg QD   - cont. home meds.    Problem/Plan - 6:  ·  Problem: Prophylactic measure.   ·  Plan: F: tolerating PO, no IVF  E: replete K<4, Mg<2  N: regular  VTE Prophylaxis: Lovenox   GI: not needed  C: Full Code  D: 7u.

## 2025-07-04 NOTE — CONSULT NOTE ADULT - ATTENDING COMMENTS
progressive gait dysfunction also with decreased mood but without cognitive issues. no weakness to confrontation. exam significant for profound hyper-reflexia, and increased tone with components of rigidity, spasticity, and paratonia. need to consider chronic meningitis / NPH / parkinson's plus condition or nutritent deficiency    - Obtain MRI brain w/wo contrast  - Obtain MRI cervical w/o contrast  - Please send vitamin B12 levels, MMA, homocysteine, zinc

## 2025-07-04 NOTE — PROGRESS NOTE ADULT - PROBLEM SELECTOR PLAN 3
Pt has 2 month hx urinary incontinence and urinary frequency without dysuria, hematuria; Utox (-), UA (-); Was taking oxybutynin 5mg outpatient without relief of symptoms. Possibly urge/functional incontinence     - Hold oxybutinin  - trial of pyridium 100mg qd x3 days  - q6h bladder scans  - consider urology f/u outpatient

## 2025-07-04 NOTE — PROGRESS NOTE ADULT - SUBJECTIVE AND OBJECTIVE BOX
OVERNIGHT EVENTS: NAEON  HR 50s to 60s   Other vitals WNL    SUBJECTIVE:  Patient seen and examined at bedside. Pt still endorse lower extremity weakness and urinary frequency. Bladder scan at 12 was 110cc.    ROS: Denies fever, chest pain, SOB, abdominal pain, paresthesia, numbness, back pain, hematuria, dysuria    Vital Signs Last 12 Hrs  T(F): 98.1 (07-02-25 @ 06:23), Max: 98.3 (07-01-25 @ 20:53)  HR: 65 (07-02-25 @ 06:23) (65 - 74)  BP: 108/70 (07-02-25 @ 06:23) (102/64 - 108/70)  BP(mean): 77 (07-01-25 @ 20:53) (77 - 77)  RR: 18 (07-02-25 @ 06:23) (17 - 18)  SpO2: 97% (07-02-25 @ 06:23) (96% - 97%)  I&O's Summary      PHYSICAL EXAM:  Constitutional: NAD, comfortable in bed.  Neck: Supple, no  Respiratory: CTA B/L. No w/r/r.   Cardiovascular: RRR, normal S1 and S2,    Gastrointestinal: +BS, soft NTND, no guarding or rebound tenderness, no palpable masses   Extremities: wwp; no cyanosis, clubbing or edema.   Vascular: Pulses equal and strong throughout.   Neurological: AAOx3, CN II - XII grossly intact, strength 5/5 b/l upper and lower extremities, sensation intact throughout. ( within functional limit)  Skin: No gross skin abnormalities or rashes        LABS:                        14.0   6.06  )-----------( 162      ( 02 Jul 2025 07:00 )             42.3     07-02    137  |  105  |  13  ----------------------------<  114[H]  3.9   |  20[L]  |  0.75    Ca    9.2      02 Jul 2025 07:00  Phos  3.4     07-02  Mg     2.0     07-02    TPro  7.3  /  Alb  4.3  /  TBili  0.9  /  DBili  x   /  AST  29  /  ALT  40  /  AlkPhos  127[H]  06-30      Urinalysis Basic - ( 02 Jul 2025 07:00 )    Color: x / Appearance: x / SG: x / pH: x  Gluc: 114 mg/dL / Ketone: x  / Bili: x / Urobili: x   Blood: x / Protein: x / Nitrite: x   Leuk Esterase: x / RBC: x / WBC x   Sq Epi: x / Non Sq Epi: x / Bacteria: x        RADIOLOGY & ADDITIONAL TESTS:    MEDICATIONS  (STANDING):  atorvastatin 20 milliGRAM(s) Oral at bedtime  enoxaparin Injectable 40 milliGRAM(s) SubCutaneous every 24 hours  perphenazine 32 milliGRAM(s) Oral <User Schedule>  perphenazine 8 milliGRAM(s) Oral at bedtime  phenazopyridine 100 milliGRAM(s) Oral every 8 hours  traZODone 100 milliGRAM(s) Oral at bedtime  valbenazine Capsule 40 milliGRAM(s) Oral daily    MEDICATIONS  (PRN):  acetaminophen     Tablet .. 650 milliGRAM(s) Oral every 6 hours PRN Temp greater or equal to 38C (100.4F), Mild Pain (1 - 3)  temazepam 15 milliGRAM(s) Oral at bedtime PRN Insomnia

## 2025-07-04 NOTE — CONSULT NOTE ADULT - SUBJECTIVE AND OBJECTIVE BOX
NEUROLOGY CONSULT    HPI:  80 yo F w/ PMHx of schizophrenia, HLD from [facility] admitted to Medicine for weakness, difficulty ambulation and urinary frequency. Neurology consulted for difficulty walking and leg weakness. Patient states that about 6 months ago she started to have difficulty with walking due to some leg weakness, she had to ask someone to help her walk to bathroom. In the past 2 months, sx got even worse. She now feels like her legs are gonna give out if she stands for a while. Before this, she was able to perform her daily activities, walk around without a cane or a walker. In the past 2 months she also noticed hand weakness bilaterally, stating it is hard for her to grab on things. She also reports worsening urinary frequency at the same time. Denies injury, recent infection/fever. Denies HA, dizziness, vision change, difficulty speaking. She also states she now has more episodes of shaking/tremors on her hands. CTH in the ED showed chronic microvascular change, no acute abnormalities.     MEDICATIONS  Home Medications:    MEDICATIONS  (STANDING):  atorvastatin 20 milliGRAM(s) Oral at bedtime  enoxaparin Injectable 40 milliGRAM(s) SubCutaneous every 24 hours  multivitamin/minerals 1 Tablet(s) Oral daily  perphenazine 32 milliGRAM(s) Oral <User Schedule>  perphenazine 8 milliGRAM(s) Oral at bedtime  thiamine IVPB 500 milliGRAM(s) IV Intermittent every 8 hours  traZODone 100 milliGRAM(s) Oral at bedtime  valbenazine Capsule 40 milliGRAM(s) Oral daily    MEDICATIONS  (PRN):  acetaminophen     Tablet .. 650 milliGRAM(s) Oral every 6 hours PRN Temp greater or equal to 38C (100.4F), Mild Pain (1 - 3)  temazepam 15 milliGRAM(s) Oral at bedtime PRN Insomnia      FAMILY HISTORY:    SOCIAL HISTORY: negative for tobacco, alcohol, or ilicit drug use.    Allergies    No Known Allergies    Intolerances      Neurological Examination:  General:  Appearance is consistent with chronologic age.   Cognitive/Language:  Awake, alert, and oriented to person, place, year, month.  Recent and remote memory intact.  Fund of knowledge is appropriate.  Naming, repetition and comprehension intact. Nondysarthric.    Cranial Nerves  - Eyes: EOMI w/o nystagmus, skew or reported double vision.  PERRL.  No ptosis/weakness of eyelid closure.    - Face:  Facial sensation normal V1 - 3, no facial asymmetry.  Hypomimia. Glabellar, palmomental +    - Ears/Nose/Throat:  Hearing grossly intact b/l to finger rub.  Palate elevates midline.  Tongue and uvula midline.   Motor exam: Increased tone. Normal bulk. No tenderness, twitching. Resting tremor in R hand noted            Upper extremity                  Bicep     Tricep     HG                                                 R      5/5        4+/5          4+/5                                                    L       5/5        4+/5          4+/5              Lower extremity                     HF          KE        KF       DF         PF                                                  R     4+/5       5/5      5-/5      5/5       5/5                                               L      4+/5      5/5       5-/5     5/5        5/5    Sensory examination:  Intact to light touch and pinprick, pain, temperature and vibration in all extremities.  Reflexes: 3+ b/l biceps, triceps, 4+ patella and 3+ achilles.  Plantar response downgoing in R, mute in L  Cerebellum: FTN mildly impaired by tremor.   Gait narrow, slow    LABS:                        13.8   5.02  )-----------( 131      ( 03 Jul 2025 05:30 )             43.3     07-03    138  |  106  |  19  ----------------------------<  111[H]  4.2   |  22  |  0.92    Ca    9.0      03 Jul 2025 05:30  Phos  4.0     07-03  Mg     2.2     07-03    TPro  6.0  /  Alb  3.9  /  TBili  0.5  /  DBili  x   /  AST  24  /  ALT  22  /  AlkPhos  101  07-03    Hemoglobin A1C:   Vitamin B12     CAPILLARY BLOOD GLUCOSE    Urinalysis Basic - ( 03 Jul 2025 05:30 )    Color: x / Appearance: x / SG: x / pH: x  Gluc: 111 mg/dL / Ketone: x  / Bili: x / Urobili: x   Blood: x / Protein: x / Nitrite: x   Leuk Esterase: x / RBC: x / WBC x   Sq Epi: x / Non Sq Epi: x / Bacteria: x      RADIOLOGY, EKG AND ADDITIONAL TESTS:  CTH: No acute intracranial hemorrhage, mass effect, or midline shift. Chronic small vessel disease.     NEUROLOGY CONSULT    HPI:  80 yo F w/ PMHx of schizophrenia, HLD from [facility] admitted to Medicine for weakness, difficulty ambulation and urinary frequency. Neurology consulted for difficulty walking and leg weakness. Patient states that about 6 months ago she started to have difficulty with walking due to some leg weakness, she had to ask someone to help her walk to bathroom. In the past 2 months, sx got even worse. She now feels like her legs are gonna give out if she stands for a while. Before this, she was able to perform her daily activities, walk around without a cane or a walker. In the past 2 months she also noticed hand weakness bilaterally, stating it is hard for her to grab on things. She also reports worsening urinary frequency at the same time. Denies injury, recent infection/fever. Denies HA, dizziness, vision change, difficulty speaking. She also states she now has more episodes of shaking/tremors on her hands. CTH in the ED showed chronic microvascular change, no acute abnormalities.     MEDICATIONS  Home Medications:    MEDICATIONS  (STANDING):  atorvastatin 20 milliGRAM(s) Oral at bedtime  enoxaparin Injectable 40 milliGRAM(s) SubCutaneous every 24 hours  multivitamin/minerals 1 Tablet(s) Oral daily  perphenazine 32 milliGRAM(s) Oral <User Schedule>  perphenazine 8 milliGRAM(s) Oral at bedtime  thiamine IVPB 500 milliGRAM(s) IV Intermittent every 8 hours  traZODone 100 milliGRAM(s) Oral at bedtime  valbenazine Capsule 40 milliGRAM(s) Oral daily    MEDICATIONS  (PRN):  acetaminophen     Tablet .. 650 milliGRAM(s) Oral every 6 hours PRN Temp greater or equal to 38C (100.4F), Mild Pain (1 - 3)  temazepam 15 milliGRAM(s) Oral at bedtime PRN Insomnia      FAMILY HISTORY:    SOCIAL HISTORY: negative for tobacco, alcohol, or ilicit drug use.    Allergies    No Known Allergies    Intolerances      Neurological Examination:  General:  Appearance is consistent with chronologic age.   Cognitive/Language:  Awake, alert, and oriented to person, place, year, month.  Recent and remote memory intact.  Fund of knowledge is appropriate.  Naming, repetition and comprehension intact. Nondysarthric.    Cranial Nerves  - Eyes: Limited upward gaze b/l, other EOM intact, no skew or reported double vision.  PERRL.  No ptosis/weakness of eyelid closure.    - Face:  Facial sensation normal V1 - 3, no facial asymmetry.  Hypomimia. Glabellar, palmomental +    - Ears/Nose/Throat:  Hearing grossly intact b/l to finger rub.  Palate elevates midline.  Tongue and uvula midline.   Motor exam: Increased tone. Normal bulk. No tenderness, twitching. Resting tremor in R hand noted. R pronator drift.            Upper extremity                  Bicep     Tricep     HG                                                 R      5/5        4+/5          4+/5                                                    L       5/5        4+/5          4+/5              Lower extremity                     HF          KE        KF       DF         PF                                                  R     4+/5       5/5      5-/5      5/5       5/5                                               L      4+/5      5/5       5-/5     5/5        5/5    Sensory examination:  Intact to light touch and pinprick, pain, temperature and vibration in all extremities.  Reflexes: 3+ b/l biceps, triceps, 4+ R knee and 3+ achilles.  Plantar response upgoing on lateral stimulation.  Cerebellum: FTN mildly impaired by tremor.   Gait narrow, slow    LABS:                        13.8   5.02  )-----------( 131      ( 03 Jul 2025 05:30 )             43.3     07-03    138  |  106  |  19  ----------------------------<  111[H]  4.2   |  22  |  0.92    Ca    9.0      03 Jul 2025 05:30  Phos  4.0     07-03  Mg     2.2     07-03    TPro  6.0  /  Alb  3.9  /  TBili  0.5  /  DBili  x   /  AST  24  /  ALT  22  /  AlkPhos  101  07-03    Hemoglobin A1C:   Vitamin B12     CAPILLARY BLOOD GLUCOSE    Urinalysis Basic - ( 03 Jul 2025 05:30 )    Color: x / Appearance: x / SG: x / pH: x  Gluc: 111 mg/dL / Ketone: x  / Bili: x / Urobili: x   Blood: x / Protein: x / Nitrite: x   Leuk Esterase: x / RBC: x / WBC x   Sq Epi: x / Non Sq Epi: x / Bacteria: x      RADIOLOGY, EKG AND ADDITIONAL TESTS:  CTH: No acute intracranial hemorrhage, mass effect, or midline shift. Chronic small vessel disease.

## 2025-07-05 LAB — T4 FREE SERPL-MCNC: 1.21 NG/DL — SIGNIFICANT CHANGE UP (ref 0.93–1.7)

## 2025-07-05 PROCEDURE — 99233 SBSQ HOSP IP/OBS HIGH 50: CPT | Mod: GC

## 2025-07-05 RX ORDER — POLYETHYLENE GLYCOL 3350 17 G/17G
17 POWDER, FOR SOLUTION ORAL AT BEDTIME
Refills: 0 | Status: DISCONTINUED | OUTPATIENT
Start: 2025-07-05 | End: 2025-07-07

## 2025-07-05 RX ADMIN — VALBENAZINE 40 MILLIGRAM(S): 80 CAPSULE ORAL at 12:22

## 2025-07-05 RX ADMIN — PERPHENAZINE 8 MILLIGRAM(S): 2 TABLET, FILM COATED ORAL at 21:31

## 2025-07-05 RX ADMIN — Medication 105 MILLIGRAM(S): at 05:16

## 2025-07-05 RX ADMIN — Medication 100 MILLIGRAM(S): at 21:31

## 2025-07-05 RX ADMIN — PERPHENAZINE 32 MILLIGRAM(S): 2 TABLET, FILM COATED ORAL at 09:16

## 2025-07-05 RX ADMIN — ENOXAPARIN SODIUM 40 MILLIGRAM(S): 100 INJECTION SUBCUTANEOUS at 21:31

## 2025-07-05 RX ADMIN — ATORVASTATIN CALCIUM 20 MILLIGRAM(S): 80 TABLET, FILM COATED ORAL at 21:31

## 2025-07-05 RX ADMIN — POLYETHYLENE GLYCOL 3350 17 GRAM(S): 17 POWDER, FOR SOLUTION ORAL at 21:31

## 2025-07-05 RX ADMIN — Medication 105 MILLIGRAM(S): at 21:32

## 2025-07-05 RX ADMIN — Medication 105 MILLIGRAM(S): at 13:07

## 2025-07-05 RX ADMIN — Medication 1 TABLET(S): at 12:22

## 2025-07-05 NOTE — PROGRESS NOTE ADULT - PROBLEM SELECTOR PLAN 1
Pt presents with 2 month hx of weakness of her b/l lower extremities, inability to stand or walk. Previously did not have difficulty with ambulation. She denies other neurologic symptoms such as numbness, tingling, dizziness, bowel incontinence. Previously was able to ambulate normally. CT head reveals chronic small vessel disease, (-) for evidence of NPH; Limited neurologic exam WNL.  Outpatient note from 2023 states patient had MRI and EMG WNL    -PT recommending MARIO Pt presents with 2 month hx of weakness of her b/l lower extremities, inability to stand or walk. Previously did not have difficulty with ambulation. She denies other neurologic symptoms such as numbness, tingling, dizziness, bowel incontinence. Previously was able to ambulate normally. CT head reveals chronic small vessel disease, (-) for evidence of NPH; Limited neurologic exam WNL.  Outpatient note from 2023 states patient had MRI and EMG WNL  On exam, patient found to have hyperreflexia    - MRI c-spine and head ordered  - Neuro on board  - Will follow neuro recs  - PT recommending MARIO

## 2025-07-05 NOTE — PROGRESS NOTE ADULT - SUBJECTIVE AND OBJECTIVE BOX
OVERNIGHT EVENTS: NAEON  HR 50s to 60s   Other vitals WNL    SUBJECTIVE:  Patient seen and examined at bedside. Pt still endorse lower extremity weakness and urinary frequency. Bladder scan at 12 was 110cc.    ROS: Denies fever, chest pain, SOB, abdominal pain, paresthesia, numbness, back pain, hematuria, dysuria    Vital Signs Last 12 Hrs  T(F): 98.1 (07-02-25 @ 06:23), Max: 98.3 (07-01-25 @ 20:53)  HR: 65 (07-02-25 @ 06:23) (65 - 74)  BP: 108/70 (07-02-25 @ 06:23) (102/64 - 108/70)  BP(mean): 77 (07-01-25 @ 20:53) (77 - 77)  RR: 18 (07-02-25 @ 06:23) (17 - 18)  SpO2: 97% (07-02-25 @ 06:23) (96% - 97%)  I&O's Summary      PHYSICAL EXAM:  Constitutional: NAD, comfortable in bed.  Neck: Supple, no  Respiratory: CTA B/L. No w/r/r.   Cardiovascular: RRR, normal S1 and S2,    Gastrointestinal: +BS, soft NTND, no guarding or rebound tenderness, no palpable masses   Extremities: wwp; no cyanosis, clubbing or edema.   Vascular: Pulses equal and strong throughout.   Neurological: AAOx3, CN II - XII grossly intact, strength 5/5 b/l upper and lower extremities, sensation intact throughout. ( within functional limit)  Skin: No gross skin abnormalities or rashes        LABS:                        14.0   6.06  )-----------( 162      ( 02 Jul 2025 07:00 )             42.3     07-02    137  |  105  |  13  ----------------------------<  114[H]  3.9   |  20[L]  |  0.75    Ca    9.2      02 Jul 2025 07:00  Phos  3.4     07-02  Mg     2.0     07-02    TPro  7.3  /  Alb  4.3  /  TBili  0.9  /  DBili  x   /  AST  29  /  ALT  40  /  AlkPhos  127[H]  06-30      Urinalysis Basic - ( 02 Jul 2025 07:00 )    Color: x / Appearance: x / SG: x / pH: x  Gluc: 114 mg/dL / Ketone: x  / Bili: x / Urobili: x   Blood: x / Protein: x / Nitrite: x   Leuk Esterase: x / RBC: x / WBC x   Sq Epi: x / Non Sq Epi: x / Bacteria: x        RADIOLOGY & ADDITIONAL TESTS:    MEDICATIONS  (STANDING):  atorvastatin 20 milliGRAM(s) Oral at bedtime  enoxaparin Injectable 40 milliGRAM(s) SubCutaneous every 24 hours  perphenazine 32 milliGRAM(s) Oral <User Schedule>  perphenazine 8 milliGRAM(s) Oral at bedtime  phenazopyridine 100 milliGRAM(s) Oral every 8 hours  traZODone 100 milliGRAM(s) Oral at bedtime  valbenazine Capsule 40 milliGRAM(s) Oral daily    MEDICATIONS  (PRN):  acetaminophen     Tablet .. 650 milliGRAM(s) Oral every 6 hours PRN Temp greater or equal to 38C (100.4F), Mild Pain (1 - 3)  temazepam 15 milliGRAM(s) Oral at bedtime PRN Insomnia     OVERNIGHT EVENTS: none    SUBJECTIVE:  Patient seen and examined at bedside. Pt still endorse lower extremity weakness and urinary frequency, no changes to severity.   ROS: Denies fever, chest pain, SOB, abdominal pain, paresthesia, numbness, back pain, hematuria, dysuria    Vital Signs Last 12 Hrs  T(F): 98.1 (07-02-25 @ 06:23), Max: 98.3 (07-01-25 @ 20:53)  HR: 65 (07-02-25 @ 06:23) (65 - 74)  BP: 108/70 (07-02-25 @ 06:23) (102/64 - 108/70)  BP(mean): 77 (07-01-25 @ 20:53) (77 - 77)  RR: 18 (07-02-25 @ 06:23) (17 - 18)  SpO2: 97% (07-02-25 @ 06:23) (96% - 97%)  I&O's Summary      PHYSICAL EXAM:  Constitutional: NAD, comfortable in bed.  Neck: Supple, no  Respiratory: CTA B/L. No w/r/r.   Cardiovascular: RRR, normal S1 and S2,    Gastrointestinal: +BS, soft NTND, no guarding or rebound tenderness, no palpable masses   Extremities: wwp; no cyanosis, clubbing or edema.   Vascular: Pulses equal and strong throughout.   Neurological: AAOx3, CN II - XII grossly intact, strength 5/5 b/l upper and lower extremities, sensation intact throughout. ( within functional limit)  Skin: No gross skin abnormalities or rashes        LABS:                        14.0   6.06  )-----------( 162      ( 02 Jul 2025 07:00 )             42.3     07-02    137  |  105  |  13  ----------------------------<  114[H]  3.9   |  20[L]  |  0.75    Ca    9.2      02 Jul 2025 07:00  Phos  3.4     07-02  Mg     2.0     07-02    TPro  7.3  /  Alb  4.3  /  TBili  0.9  /  DBili  x   /  AST  29  /  ALT  40  /  AlkPhos  127[H]  06-30      Urinalysis Basic - ( 02 Jul 2025 07:00 )    Color: x / Appearance: x / SG: x / pH: x  Gluc: 114 mg/dL / Ketone: x  / Bili: x / Urobili: x   Blood: x / Protein: x / Nitrite: x   Leuk Esterase: x / RBC: x / WBC x   Sq Epi: x / Non Sq Epi: x / Bacteria: x        RADIOLOGY & ADDITIONAL TESTS:    MEDICATIONS  (STANDING):  atorvastatin 20 milliGRAM(s) Oral at bedtime  enoxaparin Injectable 40 milliGRAM(s) SubCutaneous every 24 hours  perphenazine 32 milliGRAM(s) Oral <User Schedule>  perphenazine 8 milliGRAM(s) Oral at bedtime  phenazopyridine 100 milliGRAM(s) Oral every 8 hours  traZODone 100 milliGRAM(s) Oral at bedtime  valbenazine Capsule 40 milliGRAM(s) Oral daily    MEDICATIONS  (PRN):  acetaminophen     Tablet .. 650 milliGRAM(s) Oral every 6 hours PRN Temp greater or equal to 38C (100.4F), Mild Pain (1 - 3)  temazepam 15 milliGRAM(s) Oral at bedtime PRN Insomnia

## 2025-07-05 NOTE — PROGRESS NOTE ADULT - ASSESSMENT
Azra Mckenna is a 78 yo F PMH schizophrenia, HLD, sent in from [place] with concerns of leg weakness, unsteady gait, and urinary incontinence, and tremors for the past  two months. She denies other neurologic sx including numbness, weakness, back pain, bowel incontinence, changes in vision, dizziness, headache.  Admitted for further management Azra Mckenna is a 78 yo F PMH schizophrenia, HLD, sent in from [place] with concerns of leg weakness, unsteady gait, and urinary incontinence, and tremors for the past  two months. She denies other neurologic sx including numbness, weakness, back pain, bowel incontinence, changes in vision, dizziness, headache.  Admitted for further management. was found to have hyperreflexia as well with MRI C-spine and MRI head ordered.

## 2025-07-06 LAB
ALBUMIN SERPL ELPH-MCNC: 3.8 G/DL — SIGNIFICANT CHANGE UP (ref 3.3–5)
ALP SERPL-CCNC: 103 U/L — SIGNIFICANT CHANGE UP (ref 40–120)
ALT FLD-CCNC: 33 U/L — SIGNIFICANT CHANGE UP (ref 10–45)
ANION GAP SERPL CALC-SCNC: 7 MMOL/L — SIGNIFICANT CHANGE UP (ref 5–17)
AST SERPL-CCNC: 33 U/L — SIGNIFICANT CHANGE UP (ref 10–40)
BASOPHILS # BLD AUTO: 0.03 K/UL — SIGNIFICANT CHANGE UP (ref 0–0.2)
BASOPHILS NFR BLD AUTO: 0.5 % — SIGNIFICANT CHANGE UP (ref 0–2)
BILIRUB SERPL-MCNC: 0.4 MG/DL — SIGNIFICANT CHANGE UP (ref 0.2–1.2)
BUN SERPL-MCNC: 11 MG/DL — SIGNIFICANT CHANGE UP (ref 7–23)
CALCIUM SERPL-MCNC: 8.8 MG/DL — SIGNIFICANT CHANGE UP (ref 8.4–10.5)
CHLORIDE SERPL-SCNC: 106 MMOL/L — SIGNIFICANT CHANGE UP (ref 96–108)
CO2 SERPL-SCNC: 23 MMOL/L — SIGNIFICANT CHANGE UP (ref 22–31)
CREAT SERPL-MCNC: 0.7 MG/DL — SIGNIFICANT CHANGE UP (ref 0.5–1.3)
EGFR: 89 ML/MIN/1.73M2 — SIGNIFICANT CHANGE UP
EGFR: 89 ML/MIN/1.73M2 — SIGNIFICANT CHANGE UP
EOSINOPHIL # BLD AUTO: 0.09 K/UL — SIGNIFICANT CHANGE UP (ref 0–0.5)
EOSINOPHIL NFR BLD AUTO: 1.5 % — SIGNIFICANT CHANGE UP (ref 0–6)
FOLATE SERPL-MCNC: 16.5 NG/ML — SIGNIFICANT CHANGE UP
GLUCOSE SERPL-MCNC: 130 MG/DL — HIGH (ref 70–99)
HCT VFR BLD CALC: 40.1 % — SIGNIFICANT CHANGE UP (ref 34.5–45)
HGB BLD-MCNC: 13.2 G/DL — SIGNIFICANT CHANGE UP (ref 11.5–15.5)
IMM GRANULOCYTES # BLD AUTO: 0.02 K/UL — SIGNIFICANT CHANGE UP (ref 0–0.07)
IMM GRANULOCYTES NFR BLD AUTO: 0.3 % — SIGNIFICANT CHANGE UP (ref 0–0.9)
LYMPHOCYTES # BLD AUTO: 0.87 K/UL — LOW (ref 1–3.3)
LYMPHOCYTES NFR BLD AUTO: 14.7 % — SIGNIFICANT CHANGE UP (ref 13–44)
MAGNESIUM SERPL-MCNC: 2.2 MG/DL — SIGNIFICANT CHANGE UP (ref 1.6–2.6)
MCHC RBC-ENTMCNC: 31.1 PG — SIGNIFICANT CHANGE UP (ref 27–34)
MCHC RBC-ENTMCNC: 32.9 G/DL — SIGNIFICANT CHANGE UP (ref 32–36)
MCV RBC AUTO: 94.6 FL — SIGNIFICANT CHANGE UP (ref 80–100)
MONOCYTES # BLD AUTO: 0.47 K/UL — SIGNIFICANT CHANGE UP (ref 0–0.9)
MONOCYTES NFR BLD AUTO: 8 % — SIGNIFICANT CHANGE UP (ref 2–14)
NEUTROPHILS # BLD AUTO: 4.42 K/UL — SIGNIFICANT CHANGE UP (ref 1.8–7.4)
NEUTROPHILS NFR BLD AUTO: 75 % — SIGNIFICANT CHANGE UP (ref 43–77)
NRBC # BLD AUTO: 0 K/UL — SIGNIFICANT CHANGE UP (ref 0–0)
NRBC # FLD: 0 K/UL — SIGNIFICANT CHANGE UP (ref 0–0)
NRBC BLD AUTO-RTO: 0 /100 WBCS — SIGNIFICANT CHANGE UP (ref 0–0)
PHOSPHATE SERPL-MCNC: 3.5 MG/DL — SIGNIFICANT CHANGE UP (ref 2.5–4.5)
PLATELET # BLD AUTO: 123 K/UL — LOW (ref 150–400)
PMV BLD: 11.1 FL — SIGNIFICANT CHANGE UP (ref 7–13)
POTASSIUM SERPL-MCNC: 4.2 MMOL/L — SIGNIFICANT CHANGE UP (ref 3.5–5.3)
POTASSIUM SERPL-SCNC: 4.2 MMOL/L — SIGNIFICANT CHANGE UP (ref 3.5–5.3)
PROT SERPL-MCNC: 6.1 G/DL — SIGNIFICANT CHANGE UP (ref 6–8.3)
RBC # BLD: 4.24 M/UL — SIGNIFICANT CHANGE UP (ref 3.8–5.2)
RBC # FLD: 11.9 % — SIGNIFICANT CHANGE UP (ref 10.3–14.5)
SODIUM SERPL-SCNC: 136 MMOL/L — SIGNIFICANT CHANGE UP (ref 135–145)
VIT B12 SERPL-MCNC: 470 PG/ML — SIGNIFICANT CHANGE UP (ref 232–1245)
WBC # BLD: 5.9 K/UL — SIGNIFICANT CHANGE UP (ref 3.8–10.5)
WBC # FLD AUTO: 5.9 K/UL — SIGNIFICANT CHANGE UP (ref 3.8–10.5)

## 2025-07-06 PROCEDURE — 70553 MRI BRAIN STEM W/O & W/DYE: CPT | Mod: 26

## 2025-07-06 PROCEDURE — 99232 SBSQ HOSP IP/OBS MODERATE 35: CPT

## 2025-07-06 PROCEDURE — 72141 MRI NECK SPINE W/O DYE: CPT | Mod: 26

## 2025-07-06 RX ADMIN — ATORVASTATIN CALCIUM 20 MILLIGRAM(S): 80 TABLET, FILM COATED ORAL at 22:31

## 2025-07-06 RX ADMIN — Medication 105 MILLIGRAM(S): at 13:13

## 2025-07-06 RX ADMIN — Medication 1 TABLET(S): at 11:11

## 2025-07-06 RX ADMIN — PERPHENAZINE 8 MILLIGRAM(S): 2 TABLET, FILM COATED ORAL at 22:30

## 2025-07-06 RX ADMIN — Medication 105 MILLIGRAM(S): at 22:30

## 2025-07-06 RX ADMIN — POLYETHYLENE GLYCOL 3350 17 GRAM(S): 17 POWDER, FOR SOLUTION ORAL at 22:32

## 2025-07-06 RX ADMIN — PERPHENAZINE 32 MILLIGRAM(S): 2 TABLET, FILM COATED ORAL at 09:01

## 2025-07-06 RX ADMIN — TEMAZEPAM 15 MILLIGRAM(S): 15 CAPSULE ORAL at 23:49

## 2025-07-06 RX ADMIN — VALBENAZINE 40 MILLIGRAM(S): 80 CAPSULE ORAL at 11:42

## 2025-07-06 RX ADMIN — Medication 100 MILLIGRAM(S): at 22:31

## 2025-07-06 RX ADMIN — Medication 105 MILLIGRAM(S): at 05:51

## 2025-07-06 RX ADMIN — ENOXAPARIN SODIUM 40 MILLIGRAM(S): 100 INJECTION SUBCUTANEOUS at 22:30

## 2025-07-06 NOTE — PROGRESS NOTE ADULT - SUBJECTIVE AND OBJECTIVE BOX
Patient is a 79y old  Female who presents with a chief complaint of Leg weakness, urinary incontinence (05 Jul 2025 11:59)    INTERVAL EVENTS:  Pending MRI of cervical spine and brain   tolerating diet     SUBJECTIVE:  Patient was seen and examined at bedside.  Review of systems: No CP, dyspnea, vomiting, LE edema.     Diet, Regular (07-01-25 @ 12:49) [Active]      MEDICATIONS:  MEDICATIONS  (STANDING):  atorvastatin 20 milliGRAM(s) Oral at bedtime  enoxaparin Injectable 40 milliGRAM(s) SubCutaneous every 24 hours  multivitamin/minerals 1 Tablet(s) Oral daily  perphenazine 32 milliGRAM(s) Oral <User Schedule>  perphenazine 8 milliGRAM(s) Oral at bedtime  polyethylene glycol 3350 17 Gram(s) Oral at bedtime  thiamine IVPB 500 milliGRAM(s) IV Intermittent every 8 hours  traZODone 100 milliGRAM(s) Oral at bedtime  valbenazine Capsule 40 milliGRAM(s) Oral daily    MEDICATIONS  (PRN):  acetaminophen     Tablet .. 650 milliGRAM(s) Oral every 6 hours PRN Temp greater or equal to 38C (100.4F), Mild Pain (1 - 3)  temazepam 15 milliGRAM(s) Oral at bedtime PRN Insomnia      Allergies    No Known Allergies    Intolerances        OBJECTIVE:  Vital Signs Last 24 Hrs  T(C): 37.1 (06 Jul 2025 20:44), Max: 37.1 (06 Jul 2025 20:44)  T(F): 98.7 (06 Jul 2025 20:44), Max: 98.7 (06 Jul 2025 20:44)  HR: 64 (06 Jul 2025 20:44) (63 - 64)  BP: 130/78 (06 Jul 2025 20:44) (91/56 - 130/78)  BP(mean): 95 (06 Jul 2025 20:44) (68 - 95)  RR: 18 (06 Jul 2025 20:44) (18 - 18)  SpO2: 96% (06 Jul 2025 20:44) (96% - 97%)    Parameters below as of 06 Jul 2025 20:44  Patient On (Oxygen Delivery Method): room air      I&O's Summary    05 Jul 2025 07:01  -  06 Jul 2025 07:00  --------------------------------------------------------  IN: 492 mL / OUT: 2100 mL / NET: -1608 mL    06 Jul 2025 07:01  -  06 Jul 2025 22:18  --------------------------------------------------------  IN: 0 mL / OUT: 1100 mL / NET: -1100 mL        PHYSICAL EXAM:  Gen: Reclining in bed at time of exam, appears stated age  HEENT: NCAT, MMM, clear OP  Neck: trachea at midline  CV: RRR, +S1/S2  Pulm: adequate respiratory effort, no increase in work of breathing  Abd: soft, ND  Skin: warm and dry,   Ext: no LE edema   Neuro: Alert, oriented,,   Psych: affect and behavior appropriate, pleasant at time of interview    LABS:                        13.2   5.90  )-----------( 123      ( 06 Jul 2025 06:45 )             40.1     07-06    136  |  106  |  11  ----------------------------<  130[H]  4.2   |  23  |  0.70    Ca    8.8      06 Jul 2025 06:45  Phos  3.5     07-06  Mg     2.2     07-06    TPro  6.1  /  Alb  3.8  /  TBili  0.4  /  DBili  x   /  AST  33  /  ALT  33  /  AlkPhos  103  07-06    LIVER FUNCTIONS - ( 06 Jul 2025 06:45 )  Alb: 3.8 g/dL / Pro: 6.1 g/dL / ALK PHOS: 103 U/L / ALT: 33 U/L / AST: 33 U/L / GGT: x             CAPILLARY BLOOD GLUCOSE        Urinalysis Basic - ( 06 Jul 2025 06:45 )    Color: x / Appearance: x / SG: x / pH: x  Gluc: 130 mg/dL / Ketone: x  / Bili: x / Urobili: x   Blood: x / Protein: x / Nitrite: x   Leuk Esterase: x / RBC: x / WBC x   Sq Epi: x / Non Sq Epi: x / Bacteria: x        MICRODATA:      RADIOLOGY/OTHER STUDIES:

## 2025-07-06 NOTE — PROGRESS NOTE ADULT - PROBLEM SELECTOR PLAN 1
Pt presents with 2 month hx of weakness of her b/l lower extremities, inability to stand or walk. Previously did not have difficulty with ambulation. She denies other neurologic symptoms such as numbness, tingling, dizziness, bowel incontinence. Previously was able to ambulate normally. CT head reveals chronic small vessel disease, (-) for evidence of NPH; Limited neurologic exam WNL.  Outpatient note from 2023 states patient had MRI and EMG WNL  On exam, patient found to have hyperreflexia    - MRI c-spine and head ordered  - Neuro on board  - Will follow neuro recs  - PT recommending MARIO

## 2025-07-06 NOTE — PROGRESS NOTE ADULT - ASSESSMENT
Azra Mckenna is a 80 yo F PMH schizophrenia, HLD, sent in from [place] with concerns of leg weakness, unsteady gait, and urinary incontinence, and tremors for the past  two months. She denies other neurologic sx including numbness, weakness, back pain, bowel incontinence, changes in vision, dizziness, headache.  Admitted for further management. was found to have hyperreflexia as well with MRI C-spine and MRI head ordered.

## 2025-07-07 ENCOUNTER — TRANSCRIPTION ENCOUNTER (OUTPATIENT)
Age: 80
End: 2025-07-07

## 2025-07-07 VITALS
OXYGEN SATURATION: 97 % | SYSTOLIC BLOOD PRESSURE: 138 MMHG | RESPIRATION RATE: 18 BRPM | TEMPERATURE: 98 F | HEART RATE: 69 BPM | DIASTOLIC BLOOD PRESSURE: 72 MMHG

## 2025-07-07 LAB
RAPID RVP RESULT: SIGNIFICANT CHANGE UP
SARS-COV-2 RNA SPEC QL NAA+PROBE: SIGNIFICANT CHANGE UP

## 2025-07-07 PROCEDURE — 99239 HOSP IP/OBS DSCHRG MGMT >30: CPT | Mod: GC

## 2025-07-07 PROCEDURE — 99232 SBSQ HOSP IP/OBS MODERATE 35: CPT

## 2025-07-07 RX ORDER — PERPHENAZINE 2 MG/1
2 TABLET, FILM COATED ORAL
Qty: 0 | Refills: 0 | DISCHARGE
Start: 2025-07-07

## 2025-07-07 RX ORDER — TRAZODONE HCL 100 MG
1 TABLET ORAL
Qty: 0 | Refills: 0 | DISCHARGE
Start: 2025-07-07

## 2025-07-07 RX ORDER — TEMAZEPAM 15 MG/1
15 CAPSULE ORAL AT BEDTIME
Refills: 0 | Status: DISCONTINUED | OUTPATIENT
Start: 2025-07-07 | End: 2025-07-07

## 2025-07-07 RX ORDER — CYST/ALA/Q10/PHOS.SER/DHA/BROC 100-20-50
1 POWDER (GRAM) ORAL
Qty: 0 | Refills: 0 | DISCHARGE
Start: 2025-07-07

## 2025-07-07 RX ORDER — PERPHENAZINE 2 MG/1
1 TABLET, FILM COATED ORAL
Qty: 0 | Refills: 0 | DISCHARGE
Start: 2025-07-07

## 2025-07-07 RX ORDER — ATORVASTATIN CALCIUM 80 MG/1
1 TABLET, FILM COATED ORAL
Qty: 0 | Refills: 0 | DISCHARGE
Start: 2025-07-07

## 2025-07-07 RX ORDER — POLYETHYLENE GLYCOL 3350 17 G/17G
17 POWDER, FOR SOLUTION ORAL
Qty: 0 | Refills: 0 | DISCHARGE
Start: 2025-07-07

## 2025-07-07 RX ORDER — TEMAZEPAM 15 MG/1
1 CAPSULE ORAL
Qty: 0 | Refills: 0 | DISCHARGE
Start: 2025-07-07

## 2025-07-07 RX ORDER — VALBENAZINE 80 MG/1
1 CAPSULE ORAL
Qty: 0 | Refills: 0 | DISCHARGE
Start: 2025-07-07

## 2025-07-07 RX ADMIN — Medication 1 TABLET(S): at 12:54

## 2025-07-07 RX ADMIN — VALBENAZINE 40 MILLIGRAM(S): 80 CAPSULE ORAL at 13:23

## 2025-07-07 RX ADMIN — Medication 105 MILLIGRAM(S): at 06:20

## 2025-07-07 RX ADMIN — PERPHENAZINE 32 MILLIGRAM(S): 2 TABLET, FILM COATED ORAL at 09:45

## 2025-07-07 RX ADMIN — Medication 105 MILLIGRAM(S): at 13:18

## 2025-07-07 NOTE — DIETITIAN INITIAL EVALUATION ADULT - OTHER INFO
79F with PMH of schizophrenia and HLD who was sent in from [place] with concerns of leg weakness, unsteady gait, urinary incontinence, and tremors for the past two months, admitted for further management. Neurology consulted for difficulty walking and leg weakness. On exam, patient shows signs of underlying parkinsonism, b/l UE/LE 5/5 and peripheral sensation is intact. Suspect symptoms are likely in setting of of anti-psychotic medication Perphenazine. MRI scans reviewed and no structural signs c/f myelopathy.    Pt seen on 7UR for assessment. Labs and medication orders reviewed. Ordered for multivitamin. No updated labs 7/7, HgbA1c (7/1) WNL. On Regular diet. Pt resting in bed on visit this AM. Reports good appetite and intake, RD observed pt completing cream of wheat this AM. Pt denies issues procuring food/meals PTA, endorses assistance from home health aide x6d/week. States UBW ~115 pounds, denies wt loss PTA; admission wt 130 pounds / 59 kilograms suggests wt gain. No overt signs of wasting observed. Pt denies difficulty chewing/swallowing. Reports no nausea/vomiting/diarrhea/constipation, last BM x2d ago. No abdominal distension/discomfort noted, no pain reported. Pt confirms no known food allergies. No Christianity/ethnic/cultural food preferences noted. No pressure injuries or edema documented. See nutrition recommendations. RD to remain available.

## 2025-07-07 NOTE — PROGRESS NOTE ADULT - SUBJECTIVE AND OBJECTIVE BOX
INTERVAL HPI/OVERNIGHT EVENTS: ANIKA     SUBJECTIVE: Pt seen and examined at bedside. Reports weakness is stable, not worsening or improving.   Denies f/c/n/v/d, abd pain, SOB, CP,  sxs,     MEDICATIONS  (STANDING):  atorvastatin 20 milliGRAM(s) Oral at bedtime  enoxaparin Injectable 40 milliGRAM(s) SubCutaneous every 24 hours  multivitamin/minerals 1 Tablet(s) Oral daily  perphenazine 32 milliGRAM(s) Oral <User Schedule>  perphenazine 8 milliGRAM(s) Oral at bedtime  polyethylene glycol 3350 17 Gram(s) Oral at bedtime  thiamine IVPB 500 milliGRAM(s) IV Intermittent every 8 hours  traZODone 100 milliGRAM(s) Oral at bedtime  valbenazine Capsule 40 milliGRAM(s) Oral daily    MEDICATIONS  (PRN):  acetaminophen     Tablet .. 650 milliGRAM(s) Oral every 6 hours PRN Temp greater or equal to 38C (100.4F), Mild Pain (1 - 3)  temazepam 15 milliGRAM(s) Oral at bedtime PRN Insomnia      Vital Signs Last 24 Hrs  T(C): 36.9 (07 Jul 2025 06:26), Max: 37.1 (06 Jul 2025 20:44)  T(F): 98.5 (07 Jul 2025 06:26), Max: 98.7 (06 Jul 2025 20:44)  HR: 60 (07 Jul 2025 06:26) (60 - 64)  BP: 117/73 (07 Jul 2025 06:26) (102/60 - 130/78)  BP(mean): 88 (07 Jul 2025 06:26) (74 - 95)  RR: 18 (07 Jul 2025 06:26) (18 - 18)  SpO2: 98% (07 Jul 2025 06:26) (96% - 98%)    Parameters below as of 07 Jul 2025 06:26  Patient On (Oxygen Delivery Method): room air        PHYSICAL EXAM:  General: in no acute distress, non toxic appearing, speaking in full sentences  Eyes: PERRLA. EOMI intact bilaterally. Anicteric sclerae, moist conjunctivae  Lungs: CTA B/L. No wheezes, rales, or rhonchi  Cardiovascular: RRR. No murmurs, rubs, or gallops  Extremities: WWP, No clubbing, cyanosis or edema  Neurological: Alert and oriented, CN II-XII intact, b/l UE/LE 5/5, peripheral sensation equal and intact b/l UE/LE. Finger to nose intact.   Skin: Warm and dry. No obvious rash     LABS:                        13.2   5.90  )-----------( 123      ( 06 Jul 2025 06:45 )             40.1     07-06    136  |  106  |  11  ----------------------------<  130[H]  4.2   |  23  |  0.70    Ca    8.8      06 Jul 2025 06:45  Phos  3.5     07-06  Mg     2.2     07-06    TPro  6.1  /  Alb  3.8  /  TBili  0.4  /  DBili  x   /  AST  33  /  ALT  33  /  AlkPhos  103  07-06      Urinalysis Basic - ( 06 Jul 2025 06:45 )    Color: x / Appearance: x / SG: x / pH: x  Gluc: 130 mg/dL / Ketone: x  / Bili: x / Urobili: x   Blood: x / Protein: x / Nitrite: x   Leuk Esterase: x / RBC: x / WBC x   Sq Epi: x / Non Sq Epi: x / Bacteria: x        MICROBIOLOGY:    RADIOLOGY & ADDITIONAL STUDIES:

## 2025-07-07 NOTE — PROGRESS NOTE ADULT - ATTENDING COMMENTS
79-year-old woman with longstanding chronic schizophrenia on type I antipsychotic, VMAT2 inhibitor, temazepam found on examination to have upper motor neuron findings in the setting of subjective weakness has parkinsonian/extrapyramidal features on examination.  MRI brain and whole spine reviewed, unrevealing.  Would assess for management of extrapyramidal symptoms and ongoing rehab. Consider outpatient referral to movement disorders for DaTscan
80 yo F with a PMH of schizophrenia, HLD who presented due to leg weakness, unsteady gait, and urinary incontinence, admitted for further management.     VSS on RA     Exam:   Appears comfortable sitting up in bed   MMM  Normal WOB on RA, CTAB   RRR, no mrg   Abdomen soft, nontender, nondistended  Extremities warm and without edema   AOX3, no focal neuro deficits, intact dorsi/plantar flexion, 5/5 strength in bilateral upper and lower extremities, very small amplitude tremor when hands outstretched unchanged from yesterday     Labs reviewed   CBC unremarkable   BMP unremarkable     Imaging reviewed     A/P:   -Generalized weakness, difficulty walking: s/p CTH w/ normal appearing ventricles, no back pain and very good strength on exam in the lower extremities, intact sensation so no concern for spinal etiology of weakness at this time. PT and OT consulted, plan for MARIO.  -Urinary frequency: UA not concerning for UTI, trialing pyridium, not retaining urine. Needs close outpatient urology follow up, consider urodynamic testing.   -Schizophrenia: continue home medications     Rest as per resident note.
80 yo woman with schizophrenia , HLD, presenting with   LE weakness and instability("I feel weak, and my legs are wobby"), UE weakness ("I am unable to cut my food"), and increased urinary frequency (>10times an hour) that started suddenly 2 months ago.   Per patient, was walking without assistance, and had not urinary symptoms up till 2 months ago.     Patellar reflexes - hyperreflexic bilaterally  biceps reflexes - brisk bilaterally     Constellation of ?Upper motor neuron symptoms (instability, urinary frequency, + hyperreflexia on physical exam) , with abrupt onset two months ago. [ ] Consult neurology for help with working up ?upper motor neuron      Dispo: Pending Neurology consult and recs
78 yo F with a PMH of schizophrenia, HLD who presented due to leg weakness, unsteady gait, and urinary incontinence, admitted for further management.     VSS on RA     Exam:   Appears comfortable sitting up in bed   MMM  Normal WOB on RA, CTAB   RRR, no mrg   Abdomen soft, nontender, nondistended  Extremities warm and without edema   AOX3, no focal neuro deficits, intact dorsi/plantar flexion, 5/5 strength in bilateral upper and lower extremities, very small amplitude tremor when hands outstretched     Labs reviewed   CBC unremarkable   BMP unremarkable   A1C normal   LFTs normal   TSH normal     Imaging reviewed     A/P:   -Generalized weakness, difficulty walking: s/p CTH w/ normal appearing ventricles, no back pain and very good strength on exam in the lower extremities, intact sensation so no concern for spinal etiology of weakness at this time. PT and OT consulted.   -Urinary frequency: UA not concerning for UTI, trialing pyridium, regular bladder scans today, if retaining may need callahan placement. Needs close outpatient urology follow up, consider urodynamic testing.   -Schizophrenia: continue home medications     Rest as per resident note.
78 yo woman with schizophrenia , HLD, presenting with   LE weakness and instability("I feel weak, and my legs are wobby"), UE weakness ("I am unable to cut my food"), and increased urinary frequency (>10times an hour) that started suddenly 2 months ago.   Per patient, was walking without assistance, and had not urinary symptoms up till 2 months ago. Was hyperreflexic on physical exam last week.   Consulted neurology for help with workup of upper motor symptoms.   s/p MRI brain and cervical spine  [ ] f/u neurology recs
78 yo woman with schizophrenia , HLD, presenting with   LE weakness and instability("I feel weak, and my legs are wobby"), UE weakness ("I am unable to cut my food"), and increased urinary frequency (>10times an hour) that started suddenly 2 months ago.   Per patient, was walking without assistance, and did not have urinary symptoms up till 2 months ago.     Patellar reflexes - hyperreflexic bilaterally  biceps reflexes - brisk bilaterally     Constellation of upper motor neuron symptoms (instability, urinary frequency, + hyperreflexia on physical exam) , with abrupt onset two months ago. Consulted neurology for help with workup   - Agree with neurology recs for MRI brain w/wo contrast, MRI cervical w/o contrast , vitamin B12 levels, MMA, homocysteine, zinc

## 2025-07-07 NOTE — DIETITIAN INITIAL EVALUATION ADULT - ADD RECOMMEND
1. Continue Regular diet.   2. Monitor GI tolerance, weight trends, labs, and skin integrity.  3. Defer bowel and pain regimens to team.   4. RD to remain available for diet education/intervention prn.

## 2025-07-07 NOTE — PROGRESS NOTE ADULT - SUBJECTIVE AND OBJECTIVE BOX
Patient is a 79y old  Female who presents with a chief complaint of Leg weakness, urinary incontinence (05 Jul 2025 11:59)    INTERVAL EVENTS:  Pending MRI of cervical spine and brain   tolerating diet     SUBJECTIVE:  Patient was seen and examined at bedside.  Review of systems: No CP, dyspnea, vomiting, LE edema.     Diet, Regular (07-01-25 @ 12:49) [Active]      MEDICATIONS:  MEDICATIONS  (STANDING):  atorvastatin 20 milliGRAM(s) Oral at bedtime  enoxaparin Injectable 40 milliGRAM(s) SubCutaneous every 24 hours  multivitamin/minerals 1 Tablet(s) Oral daily  perphenazine 32 milliGRAM(s) Oral <User Schedule>  perphenazine 8 milliGRAM(s) Oral at bedtime  polyethylene glycol 3350 17 Gram(s) Oral at bedtime  thiamine IVPB 500 milliGRAM(s) IV Intermittent every 8 hours  traZODone 100 milliGRAM(s) Oral at bedtime  valbenazine Capsule 40 milliGRAM(s) Oral daily    MEDICATIONS  (PRN):  acetaminophen     Tablet .. 650 milliGRAM(s) Oral every 6 hours PRN Temp greater or equal to 38C (100.4F), Mild Pain (1 - 3)  temazepam 15 milliGRAM(s) Oral at bedtime PRN Insomnia      Allergies    No Known Allergies    Intolerances        OBJECTIVE:  Vital Signs Last 24 Hrs  T(C): 37.1 (06 Jul 2025 20:44), Max: 37.1 (06 Jul 2025 20:44)  T(F): 98.7 (06 Jul 2025 20:44), Max: 98.7 (06 Jul 2025 20:44)  HR: 64 (06 Jul 2025 20:44) (63 - 64)  BP: 130/78 (06 Jul 2025 20:44) (91/56 - 130/78)  BP(mean): 95 (06 Jul 2025 20:44) (68 - 95)  RR: 18 (06 Jul 2025 20:44) (18 - 18)  SpO2: 96% (06 Jul 2025 20:44) (96% - 97%)    Parameters below as of 06 Jul 2025 20:44  Patient On (Oxygen Delivery Method): room air      I&O's Summary    05 Jul 2025 07:01  -  06 Jul 2025 07:00  --------------------------------------------------------  IN: 492 mL / OUT: 2100 mL / NET: -1608 mL    06 Jul 2025 07:01  -  06 Jul 2025 22:18  --------------------------------------------------------  IN: 0 mL / OUT: 1100 mL / NET: -1100 mL        PHYSICAL EXAM:  Gen: Reclining in bed at time of exam, appears stated age  HEENT: NCAT, MMM, clear OP  Neck: trachea at midline  CV: RRR, +S1/S2  Pulm: adequate respiratory effort, no increase in work of breathing  Abd: soft, ND  Skin: warm and dry,   Ext: no LE edema   Neuro: Alert, oriented,,   Psych: affect and behavior appropriate, pleasant at time of interview    LABS:                        13.2   5.90  )-----------( 123      ( 06 Jul 2025 06:45 )             40.1     07-06    136  |  106  |  11  ----------------------------<  130[H]  4.2   |  23  |  0.70    Ca    8.8      06 Jul 2025 06:45  Phos  3.5     07-06  Mg     2.2     07-06    TPro  6.1  /  Alb  3.8  /  TBili  0.4  /  DBili  x   /  AST  33  /  ALT  33  /  AlkPhos  103  07-06    LIVER FUNCTIONS - ( 06 Jul 2025 06:45 )  Alb: 3.8 g/dL / Pro: 6.1 g/dL / ALK PHOS: 103 U/L / ALT: 33 U/L / AST: 33 U/L / GGT: x             CAPILLARY BLOOD GLUCOSE        Urinalysis Basic - ( 06 Jul 2025 06:45 )    Color: x / Appearance: x / SG: x / pH: x  Gluc: 130 mg/dL / Ketone: x  / Bili: x / Urobili: x   Blood: x / Protein: x / Nitrite: x   Leuk Esterase: x / RBC: x / WBC x   Sq Epi: x / Non Sq Epi: x / Bacteria: x        MICRODATA:      RADIOLOGY/OTHER STUDIES:   Patient is a 79y old  Female who presents with a chief complaint of Leg weakness, urinary incontinence (05 Jul 2025 11:59)    OVERNIGHT EVENTS: none    SUBJECTIVE:  Patient was seen and examined at bedside. States no changes in her symptoms since yesterday. Reports frequent urination and leg weakness.      MEDICATIONS:  MEDICATIONS  (STANDING):  atorvastatin 20 milliGRAM(s) Oral at bedtime  enoxaparin Injectable 40 milliGRAM(s) SubCutaneous every 24 hours  multivitamin/minerals 1 Tablet(s) Oral daily  perphenazine 32 milliGRAM(s) Oral <User Schedule>  perphenazine 8 milliGRAM(s) Oral at bedtime  polyethylene glycol 3350 17 Gram(s) Oral at bedtime  thiamine IVPB 500 milliGRAM(s) IV Intermittent every 8 hours  traZODone 100 milliGRAM(s) Oral at bedtime  valbenazine Capsule 40 milliGRAM(s) Oral daily    MEDICATIONS  (PRN):  acetaminophen     Tablet .. 650 milliGRAM(s) Oral every 6 hours PRN Temp greater or equal to 38C (100.4F), Mild Pain (1 - 3)  temazepam 15 milliGRAM(s) Oral at bedtime PRN Insomnia      Allergies    No Known Allergies    Intolerances        OBJECTIVE:  Vital Signs Last 24 Hrs  T(C): 37.1 (06 Jul 2025 20:44), Max: 37.1 (06 Jul 2025 20:44)  T(F): 98.7 (06 Jul 2025 20:44), Max: 98.7 (06 Jul 2025 20:44)  HR: 64 (06 Jul 2025 20:44) (63 - 64)  BP: 130/78 (06 Jul 2025 20:44) (91/56 - 130/78)  BP(mean): 95 (06 Jul 2025 20:44) (68 - 95)  RR: 18 (06 Jul 2025 20:44) (18 - 18)  SpO2: 96% (06 Jul 2025 20:44) (96% - 97%)    Parameters below as of 06 Jul 2025 20:44  Patient On (Oxygen Delivery Method): room air      I&O's Summary    05 Jul 2025 07:01  -  06 Jul 2025 07:00  --------------------------------------------------------  IN: 492 mL / OUT: 2100 mL / NET: -1608 mL    06 Jul 2025 07:01  -  06 Jul 2025 22:18  --------------------------------------------------------  IN: 0 mL / OUT: 1100 mL / NET: -1100 mL        PHYSICAL EXAM:  Gen: NAD, laying comfortably in bed  HEENT: NCAT, MMM, clear OP  Neck: trachea at midline  CV: RRR, +S1/S2 no m/r/g  Pulm: CTA b/l, no w/r/r  Abd: soft, nontender, no distension  Skin: warm and dry,   Ext: no LE edema   Neuro: Alert, oriented,,   Psych: affect and behavior appropriate, pleasant at time of interview    LABS:                        13.2   5.90  )-----------( 123      ( 06 Jul 2025 06:45 )             40.1     07-06    136  |  106  |  11  ----------------------------<  130[H]  4.2   |  23  |  0.70    Ca    8.8      06 Jul 2025 06:45  Phos  3.5     07-06  Mg     2.2     07-06    TPro  6.1  /  Alb  3.8  /  TBili  0.4  /  DBili  x   /  AST  33  /  ALT  33  /  AlkPhos  103  07-06    LIVER FUNCTIONS - ( 06 Jul 2025 06:45 )  Alb: 3.8 g/dL / Pro: 6.1 g/dL / ALK PHOS: 103 U/L / ALT: 33 U/L / AST: 33 U/L / GGT: x             CAPILLARY BLOOD GLUCOSE        Urinalysis Basic - ( 06 Jul 2025 06:45 )    Color: x / Appearance: x / SG: x / pH: x  Gluc: 130 mg/dL / Ketone: x  / Bili: x / Urobili: x   Blood: x / Protein: x / Nitrite: x   Leuk Esterase: x / RBC: x / WBC x   Sq Epi: x / Non Sq Epi: x / Bacteria: x        MICRODATA:      RADIOLOGY/OTHER STUDIES:

## 2025-07-07 NOTE — PROGRESS NOTE ADULT - ASSESSMENT
80 yo F w/ PMHx of schizophrenia, HLD from [facility] admitted to Medicine for weakness, difficulty ambulation and urinary frequency. Neurology consulted for difficulty walking and leg weakness. On exam, patient shows signs of underlying parkinsonism, b/l UE/LE 5/5 and peripheral sensation is intact. Bicipital reflex and patellar reflex +2 to +3. Suspect symptoms are likely iso of anti-psychotic medication Perphenazine. MRI scans reviewed and no structural signs c/f myelopathy.  - MRI H and C-spine: No acute intracranial pathology. Chronic white matter microvascular   disease.  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 Alignment is maintained. No acute fracture.  - B12 wnl, TSH wnl     Recommendations:  - Consider outpatient anti-psychotic medication adjustment.   - Consider outpatient movement disorder referral.   - Consider outpatient DaTscan if symptoms are not improving.    Neurology will sign off please reconsult as needed.   Case discussed w/ Dr. Scott.

## 2025-07-07 NOTE — DISCHARGE NOTE NURSING/CASE MANAGEMENT/SOCIAL WORK - PATIENT PORTAL LINK FT
You can access the FollowMyHealth Patient Portal offered by [facility] by registering at the following website: [email or web address]. By joining [facility]’s FollowMyHealth portal, you will also be able to view your health information using other applications (apps) compatible with our system.
Feeling of being under threat and being unable to control threat/Substance abuse/History of being victimized/traumatized/Community stressors that increase the risk of destabilization

## 2025-07-07 NOTE — PROGRESS NOTE ADULT - ASSESSMENT
Azra Mckenna is a 78 yo F PMH schizophrenia, HLD, sent in from [place] with concerns of leg weakness, unsteady gait, and urinary incontinence, and tremors for the past  two months. She denies other neurologic sx including numbness, weakness, back pain, bowel incontinence, changes in vision, dizziness, headache.  Admitted for further management. was found to have hyperreflexia as well with MRI C-spine and MRI head ordered. Azra Mckenna is a 78 yo F PMH schizophrenia, HLD, sent in from [place] with concerns of leg weakness, unsteady gait, and urinary incontinence, and tremors for the past  two months. She denies other neurologic sx including numbness, weakness, back pain, bowel incontinence, changes in vision, dizziness, headache.  Admitted for further management. was found to have hyperreflexia as well with MRI C-spine and MRI head ordered with no acute intracranial pathology, and congenital spinal canal stenosis which is moderate at the C3-C4 and C4-C5 levels. Pt states symptoms ongoing

## 2025-07-07 NOTE — PROGRESS NOTE ADULT - TIME BILLING
Bedside exam and interview   Reviewed vitals, labs   Discussed patient's plan of care with housestaff   Documentation of encounter  discussed plan for MRI with neurology attd  Excludes teaching time and/or separately reported services
Bedside exam and interview   Reviewed vitals, labs   Discussed patient's plan of care with housestaff   Documentation of encounter  Excludes teaching and separately reported services
Bedside exam and interview   Reviewed vitals, labs   Discussed patient's plan of care with housestaff   Documentation of encounter  Excludes teaching and separately reported services
Bedside exam and interview   Reviewed vitals, labs   Discussed patient's plan of care with housestaff   Documentation of encounter  Excludes teaching time and/or separately reported services
Bedside exam and interview   Reviewed vitals, labs   Discussed patient's plan of care with housestaff   Testing reflexes   Documentation of encounter  Excludes teaching time and/or separately reported services
Bedside exam and interview   Reviewed vitals, labs   Discussed patient's plan of care with housestaff   Documentation of encounter  discussed plan for MRI with neurology attd  Excludes teaching time and/or separately reported services

## 2025-07-07 NOTE — DIETITIAN INITIAL EVALUATION ADULT - PERTINENT LABORATORY DATA
07-06    136  |  106  |  11  ----------------------------<  130[H]  4.2   |  23  |  0.70    Ca    8.8      06 Jul 2025 06:45  Phos  3.5     07-06  Mg     2.2     07-06    TPro  6.1  /  Alb  3.8  /  TBili  0.4  /  DBili  x   /  AST  33  /  ALT  33  /  AlkPhos  103  07-06  A1C with Estimated Average Glucose Result: 5.4 % (07-01-25 @ 10:58)

## 2025-07-07 NOTE — DIETITIAN INITIAL EVALUATION ADULT - PROBLEM SELECTOR PLAN 1
pt started on CPAP
Pt presents with 2 month hx of weakness of her b/l lower extremities, inability to stand or walk. Previously did not have difficulty with ambulation. She denies other neurologic symptoms such as numbness, tingling, dizziness, bowel incontinence. Previously was able to ambulate normally. CT head reveals chronic small vessel disease, (-) for evidence of NPH; Limited neurologic exam WNL.  Outpatient note from 2023 states patient had MRI and EMG WNL    -PT consult

## 2025-07-07 NOTE — DIETITIAN INITIAL EVALUATION ADULT - PROBLEM SELECTOR PLAN 5
F: tolerating PO, no IVF  E: replete K<4, Mg<2  N: regular  VTE Prophylaxis: Lovenox   GI: not needed  C: Full Code  D: RMF

## 2025-07-07 NOTE — DIETITIAN INITIAL EVALUATION ADULT - PROBLEM/PLAN-4
Left voicemail for patient regarding these results/recommendations. OK per HIPAA Open telephone encounter left for reread by hub.    [facility] message sent    Hub please inform patient if call back:    Hello!    Dr. Richmond has seen the results of your IMAGING. She says the following:    Moderate stenosis , degenerative changes , stable post op changes at l4- l5   Cont medrol dose pack follow up with  pain management    If you have any further questions or to answer ours, feel free to reach back out to us!    Have a Good One!  KAMRYN Holland     DISPLAY PLAN FREE TEXT

## 2025-07-07 NOTE — DIETITIAN INITIAL EVALUATION ADULT - PERSON TAUGHT/METHOD
Encouraged continued adequate intake of balanced meals and good hydration. Pt aware RD remains available for additional questions/concerns./verbal instruction/patient instructed

## 2025-07-07 NOTE — PROGRESS NOTE ADULT - PROBLEM SELECTOR PLAN 1
Pt presents with 2 month hx of weakness of her b/l lower extremities, inability to stand or walk. Previously did not have difficulty with ambulation. She denies other neurologic symptoms such as numbness, tingling, dizziness, bowel incontinence. Previously was able to ambulate normally. CT head reveals chronic small vessel disease, (-) for evidence of NPH; Limited neurologic exam WNL.  Outpatient note from 2023 states patient had MRI and EMG WNL  On exam, patient found to have hyperreflexia    - MRI c-spine and head ordered  - Neuro on board  - Will follow neuro recs  - PT recommending [facility] Pt presents with 2 month hx of weakness of her b/l lower extremities, inability to stand or walk. Previously did not have difficulty with ambulation. She denies other neurologic symptoms such as numbness, tingling, dizziness, bowel incontinence. Previously was able to ambulate normally. CT head reveals chronic small vessel disease, (-) for evidence of NPH; Limited neurologic exam WNL.  Outpatient note from 2023 states patient had MRI and EMG WNL  On exam, patient found to have hyperreflexia    - MRI c-spine and head unremarkable  - Neuro on board  - Will follow neuro recs  - PT recommending [facility]

## 2025-07-07 NOTE — DISCHARGE NOTE NURSING/CASE MANAGEMENT/SOCIAL WORK - FINANCIAL ASSISTANCE
[facility] provides services at a reduced cost to those who are determined to be eligible through [facility]’s financial assistance program. Information regarding [facility]’s financial assistance program can be found by going to [email or web address] or by calling [phone].

## 2025-07-07 NOTE — DIETITIAN INITIAL EVALUATION ADULT - PERTINENT MEDS FT
MEDICATIONS  (STANDING):  atorvastatin 20 milliGRAM(s) Oral at bedtime  enoxaparin Injectable 40 milliGRAM(s) SubCutaneous every 24 hours  multivitamin/minerals 1 Tablet(s) Oral daily  perphenazine 32 milliGRAM(s) Oral <User Schedule>  perphenazine 8 milliGRAM(s) Oral at bedtime  polyethylene glycol 3350 17 Gram(s) Oral at bedtime  thiamine IVPB 500 milliGRAM(s) IV Intermittent every 8 hours  traZODone 100 milliGRAM(s) Oral at bedtime  valbenazine Capsule 40 milliGRAM(s) Oral daily    MEDICATIONS  (PRN):  acetaminophen     Tablet .. 650 milliGRAM(s) Oral every 6 hours PRN Temp greater or equal to 38C (100.4F), Mild Pain (1 - 3)  temazepam 15 milliGRAM(s) Oral at bedtime PRN Insomnia

## 2025-07-07 NOTE — PROGRESS NOTE ADULT - PROVIDER SPECIALTY LIST ADULT
Internal Medicine
Neurology
Rehab Medicine
Rehab Medicine
Internal Medicine

## 2025-07-07 NOTE — PROGRESS NOTE ADULT - PROBLEM SELECTOR PLAN 3
Pt has 2 month hx urinary incontinence and urinary frequency without dysuria, hematuria; Utox (-), UA (-); Was taking oxybutynin 5mg outpatient without relief of symptoms. Possibly urge/functional incontinence     - Hold oxybutinin  - trial of pyridium 100mg qd x3 days  - q6h bladder scans  - consider urology f/u outpatient Pt has 2 month hx urinary incontinence and urinary frequency without dysuria, hematuria; Utox (-), UA (-); Was taking oxybutynin 5mg outpatient without relief of symptoms. Possibly urge/functional incontinence     - Hold oxybutinin  - q6h bladder scans  - consider urology f/u outpatient

## 2025-07-07 NOTE — PROGRESS NOTE ADULT - PROBLEM SELECTOR PLAN 6
F: tolerating PO, no IVF  E: replete K<4, Mg<2  N: regular  VTE Prophylaxis: Lovenox   GI: not needed  C: Full Code  D: Dispo to [facility] pending auth F: tolerating PO, no IVF  E: replete K<4, Mg<2  N: regular  VTE Prophylaxis: Lovenox   GI: not needed  C: Full Code  D: 7U

## 2025-07-07 NOTE — DIETITIAN INITIAL EVALUATION ADULT - PROBLEM SELECTOR PLAN 2
Pt has 2 month hx urinary incontinence and urinary frequency without dysuria, hematuria; Utox (-), UA (-); Was taking oxybutynin 5mg outpatient without relief of symptoms. Possibly urge/functional incontinence     - Hold oxybutinin  - trial of pyridium 100mg qd x3 days  - F/u A1c  - consider urology f/u outpatient

## 2025-07-07 NOTE — PROGRESS NOTE ADULT - REASON FOR ADMISSION
Leg weakness, urinary incontinence

## 2025-07-09 LAB — METHYLMALONATE SERPL-SCNC: 202 NMOL/L — SIGNIFICANT CHANGE UP (ref 0–378)

## 2025-07-15 DIAGNOSIS — Z11.52 ENCOUNTER FOR SCREENING FOR COVID-19: ICD-10-CM

## 2025-07-15 DIAGNOSIS — G24.01 DRUG INDUCED SUBACUTE DYSKINESIA: ICD-10-CM
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
## 2025-07-15 DIAGNOSIS — R53.1 WEAKNESS: ICD-10-CM

## 2025-07-15 DIAGNOSIS — R26.81 UNSTEADINESS ON FEET: ICD-10-CM

## 2025-07-15 DIAGNOSIS — M50.323 OTHER CERVICAL DISC DEGENERATION AT C6-C7 LEVEL: ICD-10-CM

## 2025-07-15 DIAGNOSIS — R25.1 TREMOR, UNSPECIFIED: ICD-10-CM

## 2025-07-15 DIAGNOSIS — G47.00 INSOMNIA, UNSPECIFIED: ICD-10-CM

## 2025-07-15 DIAGNOSIS — T43.595A ADVERSE EFFECT OF OTHER ANTIPSYCHOTICS AND NEUROLEPTICS, INITIAL ENCOUNTER: ICD-10-CM

## 2025-07-15 DIAGNOSIS — E78.5 HYPERLIPIDEMIA, UNSPECIFIED: ICD-10-CM

## 2025-07-15 DIAGNOSIS — M48.02 SPINAL STENOSIS, CERVICAL REGION: ICD-10-CM

## 2025-07-15 DIAGNOSIS — Z79.02 LONG TERM (CURRENT) USE OF ANTITHROMBOTICS/ANTIPLATELETS: ICD-10-CM

## 2025-07-15 DIAGNOSIS — F20.9 SCHIZOPHRENIA, UNSPECIFIED: ICD-10-CM

## 2025-07-15 DIAGNOSIS — G25.9 EXTRAPYRAMIDAL AND MOVEMENT DISORDER, UNSPECIFIED: ICD-10-CM

## 2025-07-15 DIAGNOSIS — R39.81 FUNCTIONAL URINARY INCONTINENCE: ICD-10-CM

## 2025-07-18 ENCOUNTER — APPOINTMENT (OUTPATIENT)
Dept: UROLOGY | Facility: CLINIC | Age: 80
End: 2025-07-18